# Patient Record
Sex: FEMALE | Race: WHITE | NOT HISPANIC OR LATINO | Employment: UNEMPLOYED | URBAN - METROPOLITAN AREA
[De-identification: names, ages, dates, MRNs, and addresses within clinical notes are randomized per-mention and may not be internally consistent; named-entity substitution may affect disease eponyms.]

---

## 2024-05-08 ENCOUNTER — OFFICE VISIT (OUTPATIENT)
Dept: URGENT CARE | Facility: CLINIC | Age: 74
End: 2024-05-08
Payer: COMMERCIAL

## 2024-05-08 VITALS
OXYGEN SATURATION: 96 % | DIASTOLIC BLOOD PRESSURE: 85 MMHG | RESPIRATION RATE: 16 BRPM | HEART RATE: 89 BPM | HEIGHT: 60 IN | SYSTOLIC BLOOD PRESSURE: 144 MMHG | BODY MASS INDEX: 26.11 KG/M2 | WEIGHT: 133 LBS | TEMPERATURE: 98 F

## 2024-05-08 DIAGNOSIS — J45.40 MODERATE PERSISTENT ASTHMA, UNSPECIFIED WHETHER COMPLICATED: Primary | ICD-10-CM

## 2024-05-08 DIAGNOSIS — R06.02 CHRONIC SHORTNESS OF BREATH: ICD-10-CM

## 2024-05-08 PROCEDURE — 99204 OFFICE O/P NEW MOD 45 MIN: CPT | Mod: S$GLB,,,

## 2024-05-08 RX ORDER — VITAMIN E (DL,TOCOPHERYL ACET) 22.5 MG/ML
DROPS ORAL
COMMUNITY

## 2024-05-08 RX ORDER — LEVALBUTEROL TARTRATE 45 UG/1
AEROSOL, METERED ORAL
COMMUNITY
End: 2024-05-08

## 2024-05-08 RX ORDER — OLMESARTAN MEDOXOMIL 40 MG/1
40 TABLET ORAL
COMMUNITY
Start: 2024-03-27

## 2024-05-08 RX ORDER — AMILORIDE HYDROCHLORIDE 5 MG/1
TABLET ORAL
COMMUNITY

## 2024-05-08 RX ORDER — AMLODIPINE BESYLATE 5 MG/1
5 TABLET ORAL
COMMUNITY
Start: 2024-04-15

## 2024-05-08 RX ORDER — LEVALBUTEROL TARTRATE 45 UG/1
1-2 AEROSOL, METERED ORAL EVERY 4 HOURS PRN
Qty: 15 G | Refills: 2 | Status: SHIPPED | OUTPATIENT
Start: 2024-05-08 | End: 2025-05-08

## 2024-05-08 NOTE — PROGRESS NOTES
Subjective:      Patient ID: Anjali Morfin is a 73 y.o. female.    Vitals:  height is 5' (1.524 m) and weight is 60.3 kg (133 lb). Her temperature is 97.5 °F (36.4 °C). Her blood pressure is 144/85 (abnormal) and her pulse is 89. Her respiration is 16 and oxygen saturation is 96%.     Chief Complaint: Medication Refill    Pt needs refill on inhaler. Referral to pulmonologist. Hx of asthma.  Patient reports shortness of breath as usual for her and use of her levalbuterol inhaler approximately twice a day.  She is currently in the market for a new primary care provider and would like a referral to a pulmonologist.  Patient was able to speak in complete sentences and lung sounds are clear bilaterally at time of exam.  Patient does not feel like she was in acute urgency she just needs a refill on her inhaler    Medication Refill  This is a new problem. Pertinent negatives include no coughing.       Constitution: Negative.   HENT: Negative.     Neck: neck negative.   Cardiovascular: Negative.    Respiratory:  Positive for chest tightness, shortness of breath (intermittent) and asthma. Negative for cough, sputum production and wheezing.    Gastrointestinal: Negative.    Musculoskeletal: Negative.    Skin: Negative.    Allergic/Immunologic: Positive for asthma.   Neurological: Negative.    Psychiatric/Behavioral: Negative.        Objective:     Physical Exam   Constitutional: She is oriented to person, place, and time. She does not appear ill. No distress. normal  HENT:   Head: Normocephalic and atraumatic.   Ears:   Right Ear: External ear normal.   Left Ear: External ear normal.   Nose: Nose normal.   Mouth/Throat: Mucous membranes are moist.   Eyes: Conjunctivae are normal.   Cardiovascular: Normal rate and normal heart sounds.   Pulmonary/Chest: Effort normal and breath sounds normal. No respiratory distress. She has no wheezes. She has no rhonchi. She has no rales.   Abdominal: Normal appearance.   Musculoskeletal:  Normal range of motion.         General: Normal range of motion.   Neurological: She is alert and oriented to person, place, and time. She displays no weakness.   Skin: Skin is warm and dry.   Psychiatric: Her behavior is normal. Mood, judgment and thought content normal.   Nursing note and vitals reviewed.      Assessment:     1. Moderate persistent asthma, unspecified whether complicated    2. Chronic shortness of breath        Plan:       Moderate persistent asthma, unspecified whether complicated  -     levalbuterol (XOPENEX HFA) 45 mcg/actuation inhaler; Inhale 1-2 puffs into the lungs every 4 (four) hours as needed for Wheezing. Rescue  Dispense: 15 g; Refill: 2  -     Ambulatory referral/consult to Pulmonology    Chronic shortness of breath  -     levalbuterol (XOPENEX HFA) 45 mcg/actuation inhaler; Inhale 1-2 puffs into the lungs every 4 (four) hours as needed for Wheezing. Rescue  Dispense: 15 g; Refill: 2      Discussed medication with patient who acknowledges understanding and is agreeable to POC. Follow up with primary care. Increase fluid intake. Red flags for ER discussed.

## 2024-05-08 NOTE — PATIENT INSTRUCTIONS
Inhaler as prescribed    Please follow up with pulmonology and let us know if there is anything else we can help you with

## 2024-08-29 ENCOUNTER — HOSPITAL ENCOUNTER (EMERGENCY)
Facility: HOSPITAL | Age: 74
Discharge: HOME | End: 2024-08-29
Attending: EMERGENCY MEDICINE
Payer: COMMERCIAL

## 2024-08-29 ENCOUNTER — OFFICE VISIT (OUTPATIENT)
Dept: URGENT CARE | Facility: CLINIC | Age: 74
End: 2024-08-29
Payer: COMMERCIAL

## 2024-08-29 VITALS
HEIGHT: 60 IN | RESPIRATION RATE: 16 BRPM | OXYGEN SATURATION: 98 % | BODY MASS INDEX: 28.07 KG/M2 | WEIGHT: 143 LBS | DIASTOLIC BLOOD PRESSURE: 76 MMHG | HEART RATE: 88 BPM | TEMPERATURE: 98 F | SYSTOLIC BLOOD PRESSURE: 156 MMHG

## 2024-08-29 VITALS
TEMPERATURE: 98 F | RESPIRATION RATE: 20 BRPM | HEIGHT: 60 IN | WEIGHT: 144 LBS | DIASTOLIC BLOOD PRESSURE: 67 MMHG | OXYGEN SATURATION: 99 % | SYSTOLIC BLOOD PRESSURE: 149 MMHG | BODY MASS INDEX: 28.27 KG/M2 | HEART RATE: 96 BPM

## 2024-08-29 DIAGNOSIS — L03.114 LEFT ARM CELLULITIS: Primary | ICD-10-CM

## 2024-08-29 DIAGNOSIS — M79.89 LEFT ARM SWELLING: Primary | ICD-10-CM

## 2024-08-29 PROCEDURE — 99284 EMERGENCY DEPT VISIT MOD MDM: CPT

## 2024-08-29 RX ORDER — CEPHALEXIN 500 MG/1
500 CAPSULE ORAL 4 TIMES DAILY
Qty: 40 CAPSULE | Refills: 0 | Status: SHIPPED | OUTPATIENT
Start: 2024-08-29 | End: 2024-09-08

## 2024-08-29 RX ORDER — ERGOCALCIFEROL 1.25 MG/1
50000 CAPSULE ORAL
COMMUNITY

## 2024-08-29 RX ORDER — MULTIVITAMIN
1 TABLET ORAL DAILY
COMMUNITY

## 2024-08-29 RX ORDER — CALCIUM CARBONATE 200(500)MG
1 TABLET,CHEWABLE ORAL
COMMUNITY

## 2024-08-29 RX ORDER — ATENOLOL 50 MG/1
TABLET ORAL
COMMUNITY

## 2024-08-29 RX ORDER — HYDROCORTISONE 25 MG/G
CREAM TOPICAL DAILY
Qty: 28 G | Refills: 2 | Status: SHIPPED | OUTPATIENT
Start: 2024-08-29 | End: 2024-09-05

## 2024-08-29 RX ORDER — HYDROGEN PEROXIDE 3 %
20 SOLUTION, NON-ORAL MISCELLANEOUS
COMMUNITY

## 2024-08-29 RX ORDER — LISINOPRIL 40 MG/1
40 TABLET ORAL
COMMUNITY

## 2024-08-29 NOTE — DISCHARGE INSTRUCTIONS
Allergy to polyethylene glycol additive noted in you should inquire with the pharmacist to ensure this is not an additive to this particular preparation prior to initiation.  This may be substituted for a different preparation if this is an issue.  Consult with the pharmacist.  Topical steroid recommended in case of allergic phenomenon as a cause of redness and swelling.

## 2024-08-29 NOTE — PROGRESS NOTES
Subjective:      Patient ID: Anjali Morfin is a 74 y.o. female.    Vitals:  height is 5' (1.524 m) and weight is 64.9 kg (143 lb). Her temperature is 97.9 °F (36.6 °C). Her blood pressure is 156/76 (abnormal) and her pulse is 88. Her respiration is 16 and oxygen saturation is 98%.     Chief Complaint: Edema    Anjali Morfin is a 74 year old female presenting to the clinic with redness, warmth, and swelling to the left upper extremity. She denies injury/trauma and has had no fever.     Edema  This is a new problem. The current episode started today. The problem has been gradually worsening. Associated symptoms include joint swelling.       Constitution: Negative.   HENT: Negative.     Neck: neck negative.   Cardiovascular: Negative.    Eyes: Negative.    Respiratory: Negative.     Gastrointestinal: Negative.    Genitourinary: Negative.    Musculoskeletal:  Positive for joint swelling.   Skin:  Positive for color change.   Neurological: Negative.       Objective:     Physical Exam   Constitutional: She is oriented to person, place, and time. She appears well-developed. She is cooperative.  Non-toxic appearance. She does not appear ill. No distress.   HENT:   Head: Normocephalic and atraumatic.   Ears:   Right Ear: Hearing and external ear normal.   Left Ear: Hearing and external ear normal.   Nose: Nose normal. No mucosal edema, rhinorrhea or nasal deformity. No epistaxis. Right sinus exhibits no maxillary sinus tenderness and no frontal sinus tenderness. Left sinus exhibits no maxillary sinus tenderness and no frontal sinus tenderness.   Mouth/Throat: Uvula is midline, oropharynx is clear and moist and mucous membranes are normal. No trismus in the jaw. Normal dentition. No uvula swelling. No oropharyngeal exudate, posterior oropharyngeal edema or posterior oropharyngeal erythema.   Eyes: Conjunctivae and lids are normal. No scleral icterus.   Neck: Trachea normal and phonation normal. Neck supple. No edema  present. No erythema present. No neck rigidity present.   Cardiovascular: Normal rate, regular rhythm, normal heart sounds and normal pulses.   Pulmonary/Chest: Effort normal and breath sounds normal. No respiratory distress. She has no decreased breath sounds. She has no rhonchi.   Abdominal: Normal appearance.   Musculoskeletal: Normal range of motion.         General: No deformity. Normal range of motion.      Left upper arm: She exhibits swelling.      Comments: Full ROM of left wrist, elbow  Left upper extremity erythematous, warm from wrist to upper arm   Neurological: She is alert and oriented to person, place, and time. She exhibits normal muscle tone. Coordination normal.   Skin: Skin is warm, dry, intact, not diaphoretic and not pale.   Psychiatric: Her speech is normal and behavior is normal. Judgment and thought content normal.   Nursing note and vitals reviewed.      Assessment:     1. Left arm swelling        Plan:   The patient has sudden onset of left upper extremity erythema, warmth, and swelling. Full ROM of wrist and elbow. Multiple allergies listed. She will require evaluation in the ED for labs/imaging and further management. EMS offered but patient refused and will seek treatment via personal vehicle.     Left arm swelling

## 2024-08-29 NOTE — ED PROVIDER NOTES
Chief complaint:  Arm Swelling (Patient has left arm swelling and redness since yesterday )      HPI:  Anjali Morfin is a 74 y.o. female with hx asthma, multiple allergies including to abx presenting with less than one day of mild edema and redness to the left forearm region.  She does note multiple prior resolved lesion see associated with likely bug bites within the last several days including one on the forearm preceding.  These have receded.  She denies other systemic symptoms such as fever or vomiting.  There is no pain with movement of the arm.  There is no fall or injury.  She does have pre-existing dermatitis on one aspect of the forearm but with a greater redness present along with mild swelling not involving the hand.  There is no new numbness or weakness.    ROS: As per HPI and below:  No joint pain, vomiting, new rash elsewhere, lip or tongue swelling.    Review of patient's allergies indicates:   Allergen Reactions    Ciprofloxacin Itching     Contains polyethylene glycol    Levofloxacin Itching     Contains polyethylene glycol    Polyethylene glycol Itching and Shortness Of Breath    Polyethylene glycols Itching and Rash     Other Reaction(s): wheezing    Sulfa (sulfonamide antibiotics) Itching, Rash and Shortness Of Breath    Valsartan Itching and Shortness Of Breath     Contains polyethylene glycol    Acetaminophen      Due to ingredients    Albuterol sulfate Itching     Made asthma problems worse    Cephalexin Itching    Clindamycin Itching    Hydrochlorothiazide Itching    Ibuprofen      Due to ingredients    Sulfate ion     Celecoxib Itching and Rash    Doxycycline Itching and Rash    Oxycodone Itching and Rash    Oxycodone-acetaminophen Itching and Rash    Penicillin Rash     Family history of it. Pt herself has not had reaction       Patient's Medications   New Prescriptions    CEPHALEXIN (KEFLEX) 500 MG CAPSULE    Take 1 capsule (500 mg total) by mouth 4 (four) times daily. for 10 days     HYDROCORTISONE 2.5 % CREAM    Apply topically once daily. for 7 days   Previous Medications    AMILORIDE (MIDAMOR) 5 MG TAB    1 tablet twice a day by oral route.    AMLODIPINE (NORVASC) 5 MG TABLET    Take 5 mg by mouth.    ASCORBIC ACID, VITAMIN C, ORAL    Take by mouth.    ATENOLOL (TENORMIN) 50 MG TABLET    1 tablet twice a day by oral route.    BUDESONIDE 180MCG (PULMICORT 180MCG) 180 MCG/ACTUATION AEPB    1 inhalation twice a day by inhalation route.    CALCIUM CARBONATE (TUMS) 200 MG CALCIUM (500 MG) CHEWABLE TABLET    Take 1 tablet by mouth.    ERGOCALCIFEROL (VITAMIN D2) 50,000 UNIT CAP    Take 50,000 Units by mouth every 7 days.    ESOMEPRAZOLE (NEXIUM) 20 MG CAPSULE    Take 20 mg by mouth before breakfast.    LEVALBUTEROL (XOPENEX HFA) 45 MCG/ACTUATION INHALER    Inhale 1-2 puffs into the lungs every 4 (four) hours as needed for Wheezing. Rescue    LISINOPRIL (PRINIVIL,ZESTRIL) 40 MG TABLET    Take 40 mg by mouth.    MULTIVITAMIN (THERAGRAN) PER TABLET    Take 1 tablet by mouth once daily.    OLMESARTAN (BENICAR) 40 MG TABLET    Take 40 mg by mouth.    VITAMIN E, DL, ACETATE, 22.5 MG (50 UNIT)/ML DROP    Take by mouth.   Modified Medications    No medications on file   Discontinued Medications    No medications on file       PMH:  As per HPI and below:  No past medical history on file.  No past surgical history on file.    Social History     Socioeconomic History    Marital status:        No family history on file.    Physical Exam:    Vitals:    08/29/24 1102   BP: (!) 149/67   Pulse: 96   Resp:    Temp:      GENERAL:  No apparent distress.  Alert.    HEENT:  Moist mucous membranes.  Normocephalic and atraumatic.    NECK:  No swelling.  Midline trachea.   CARDIOVASCULAR:  Regular rate and rhythm.  2+ radial pulses.    PULMONARY:  Lungs clear to auscultation bilaterally.  No wheezes, rales, or rhonci.    EXTREMITIES:  Warm and well perfused.  Brisk capillary refill.  Mild noncircumferential edema  and erythema to the left forearm sparing the wrist and hand.  This does not involve the humeral region.  There is minimal tenderness.  There is no crepitus.  There is no pain with passive or active range of motion at the elbow and wrist.  NEUROLOGICAL:  Normal mental status.  Appropriate and conversant.  5/5 strength with equal sensation light touch in the bilateral upper extremities.  SKIN:  Left upper extremity rash as described above in picture below.        Labs Reviewed - No data to display    Current Discharge Medication List        START taking these medications    Details   cephALEXin (KEFLEX) 500 MG capsule Take 1 capsule (500 mg total) by mouth 4 (four) times daily. for 10 days  Qty: 40 capsule, Refills: 0      hydrocortisone 2.5 % cream Apply topically once daily. for 7 days  Qty: 28 g, Refills: 2           CONTINUE these medications which have NOT CHANGED    Details   aMILoride (MIDAMOR) 5 MG Tab 1 tablet twice a day by oral route.      amLODIPine (NORVASC) 5 MG tablet Take 5 mg by mouth.      ASCORBIC ACID, VITAMIN C, ORAL Take by mouth.      atenoloL (TENORMIN) 50 MG tablet 1 tablet twice a day by oral route.      budesonide 180mcg (PULMICORT 180MCG) 180 mcg/actuation AePB 1 inhalation twice a day by inhalation route.      calcium carbonate (TUMS) 200 mg calcium (500 mg) chewable tablet Take 1 tablet by mouth.      ergocalciferol (VITAMIN D2) 50,000 unit Cap Take 50,000 Units by mouth every 7 days.      esomeprazole (NEXIUM) 20 MG capsule Take 20 mg by mouth before breakfast.      levalbuterol (XOPENEX HFA) 45 mcg/actuation inhaler Inhale 1-2 puffs into the lungs every 4 (four) hours as needed for Wheezing. Rescue  Qty: 15 g, Refills: 2    Associated Diagnoses: Moderate persistent asthma, unspecified whether complicated; Chronic shortness of breath      lisinopriL (PRINIVIL,ZESTRIL) 40 MG tablet Take 40 mg by mouth.      multivitamin (THERAGRAN) per tablet Take 1 tablet by mouth once daily.       olmesartan (BENICAR) 40 MG tablet Take 40 mg by mouth.      vitamin E, dl, acetate, 22.5 mg (50 unit)/mL Drop Take by mouth.             No orders of the defined types were placed in this encounter.      Imaging Results    None              MDM:    74 y.o. female with left arm swelling and redness.  Differential includes localizer reaction versus cellulitis.  I doubt deep space infection or life-threatening process such as pyomyositis or necrotizing fasciitis.  I do not think further emergent surgical consultation or imaging is indicated.  I do not think she requires admission for antibiotics.  P.o. antibiotics begun in case for early cellulitis with outpatient follow-up recommended.  Localized reaction considered with topical steroid also recommended pending follow-up.  There is no sign of anaphylaxis.  Patient has numerous allergies reviewed and cephalosporin allergy only relates to potential polyethylene glycol additive.  I did instruct her to review this with the pharmacist prior to beginning medication with substitution as necessary.  Cephalexin 10 day course prescribed pending close follow-up.  Detailed return precautions reviewed.    Diagnoses:    1. Left upper extremity cellulitis       Khoi Telles MD  08/29/24 1125

## 2024-10-02 ENCOUNTER — TELEPHONE (OUTPATIENT)
Dept: URGENT CARE | Facility: CLINIC | Age: 74
End: 2024-10-02

## 2024-10-02 ENCOUNTER — OFFICE VISIT (OUTPATIENT)
Dept: URGENT CARE | Facility: CLINIC | Age: 74
End: 2024-10-02
Payer: COMMERCIAL

## 2024-10-02 VITALS
BODY MASS INDEX: 28.27 KG/M2 | TEMPERATURE: 98 F | DIASTOLIC BLOOD PRESSURE: 86 MMHG | WEIGHT: 144 LBS | HEIGHT: 60 IN | SYSTOLIC BLOOD PRESSURE: 156 MMHG | HEART RATE: 98 BPM | OXYGEN SATURATION: 98 %

## 2024-10-02 DIAGNOSIS — Z87.09 HISTORY OF ASTHMA: ICD-10-CM

## 2024-10-02 DIAGNOSIS — Z86.79 HISTORY OF HYPERTENSION: ICD-10-CM

## 2024-10-02 DIAGNOSIS — J45.901 EXACERBATION OF ASTHMA, UNSPECIFIED ASTHMA SEVERITY, UNSPECIFIED WHETHER PERSISTENT: ICD-10-CM

## 2024-10-02 DIAGNOSIS — M19.90 OSTEOARTHRITIS, UNSPECIFIED OSTEOARTHRITIS TYPE, UNSPECIFIED SITE: ICD-10-CM

## 2024-10-02 DIAGNOSIS — R06.02 SOB (SHORTNESS OF BREATH): Primary | ICD-10-CM

## 2024-10-02 PROBLEM — R21 DIFFUSE PAPULAR RASH: Status: ACTIVE | Noted: 2017-08-09

## 2024-10-02 PROCEDURE — 94640 AIRWAY INHALATION TREATMENT: CPT | Mod: S$GLB,,, | Performed by: NURSE PRACTITIONER

## 2024-10-02 PROCEDURE — 99214 OFFICE O/P EST MOD 30 MIN: CPT | Mod: 25,S$GLB,, | Performed by: NURSE PRACTITIONER

## 2024-10-02 RX ORDER — PREDNISONE 20 MG/1
20 TABLET ORAL 2 TIMES DAILY
Qty: 6 TABLET | Refills: 0 | Status: SHIPPED | OUTPATIENT
Start: 2024-10-02 | End: 2024-10-05

## 2024-10-02 RX ORDER — LEVALBUTEROL INHALATION SOLUTION 1.25 MG/3ML
1.25 SOLUTION RESPIRATORY (INHALATION)
Status: COMPLETED | OUTPATIENT
Start: 2024-10-02 | End: 2024-10-02

## 2024-10-02 RX ORDER — LEVALBUTEROL TARTRATE 45 UG/1
1-2 AEROSOL, METERED ORAL EVERY 4 HOURS PRN
Qty: 15 G | Refills: 1 | Status: SHIPPED | OUTPATIENT
Start: 2024-10-02

## 2024-10-02 RX ADMIN — LEVALBUTEROL INHALATION SOLUTION 1.25 MG: 1.25 SOLUTION RESPIRATORY (INHALATION) at 04:10

## 2024-10-02 NOTE — PROGRESS NOTES
Subjective:      Patient ID: Anjali Morfin is a 74 y.o. female.    Vitals:  height is 5' (1.524 m) and weight is 65.3 kg (144 lb). Her oral temperature is 98.2 °F (36.8 °C). Her blood pressure is 156/86 (abnormal) and her pulse is 98. Her oxygen saturation is 98%.     Chief Complaint: Asthma    Patient reports she has a history asthma and has not had issues with her asthma for several weeks and is out of her Xopenex inhaler.  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  Denies viral illness symptoms.  Denies chest pain states that her symptoms are exactly as when her asthma flares up.    Asthma  She complains of shortness of breath. There is no cough. The current episode started today. Pertinent negatives include no chest pain, fever or sore throat. Her past medical history is significant for asthma.       Constitution: Negative for chills and fever.   HENT:  Negative for congestion and sore throat.    Cardiovascular:  Negative for chest pain.   Respiratory:  Positive for chest tightness, shortness of breath and asthma. Negative for cough.    Skin:  Negative for rash.   Allergic/Immunologic: Positive for asthma.      Objective:     Physical Exam   Constitutional: She is oriented to person, place, and time. She is cooperative.  Non-toxic appearance. She does not appear ill. No distress. awake  HENT:   Head: Normocephalic and atraumatic.   Nose: Nose normal.   Mouth/Throat: Mucous membranes are moist.   Eyes: Conjunctivae are normal.   Cardiovascular: Normal rate.   Pulmonary/Chest: Effort normal. No accessory muscle usage or stridor. No tachypnea. No respiratory distress. She has decreased breath sounds in the right middle field, the right lower field, the left middle field and the left lower field. She has no wheezes. She has no rhonchi. She has no rales. She exhibits no tenderness.   Able to speak in  full sentences without pause for breath         Comments: Able to speak in full sentences without pause for breath    Abdominal: Normal appearance.   Neurological: no focal deficit. She is alert and oriented to person, place, and time.   Skin: Skin is warm, dry, not diaphoretic and no rash. Capillary refill takes 2 to 3 seconds.   Psychiatric: Mood normal.   Nursing note and vitals reviewed.      Assessment:     1. SOB (shortness of breath)    2. Exacerbation of asthma, unspecified asthma severity, unspecified whether persistent    3. History of hypertension    4. Osteoarthritis, unspecified osteoarthritis type, unspecified site    5. History of asthma      Xopenex nebulizer tx given in clinic.  Pt reports breathing improved.  BBS with improved air movement, no wheezing.   Plan:       SOB (shortness of breath)  -     levalbuterol nebulizer solution 1.25 mg    Exacerbation of asthma, unspecified asthma severity, unspecified whether persistent  -     levalbuterol nebulizer solution 1.25 mg  -     levalbuterol (XOPENEX HFA) 45 mcg/actuation inhaler; Inhale 1-2 puffs into the lungs every 4 (four) hours as needed for Wheezing. Rescue  Dispense: 15 g; Refill: 1  -     Ambulatory referral/consult to Pulmonology  -     Ambulatory referral/consult to Family Practice  -     predniSONE (DELTASONE) 20 MG tablet; Take 1 tablet (20 mg total) by mouth 2 (two) times daily. for 3 days  Dispense: 6 tablet; Refill: 0    History of hypertension  -     Ambulatory referral/consult to Family Practice    Osteoarthritis, unspecified osteoarthritis type, unspecified site  -     Ambulatory referral/consult to Family Practice    History of asthma  -     Ambulatory referral/consult to Family Practice

## 2024-10-02 NOTE — PATIENT INSTRUCTIONS
Xopenex inhaler every 4 hours as needed for chest tightness, shortness of breath, wheezing.    Prednisone take as prescribed if symptoms persist    Referral was placed to pulmonology.  Someone should be calling you to schedule an appointment    Referral was placed to family medicine.  Someone should be calling you to schedule an appointment    Return to clinic or seek medical re-evaluation if symptoms worsen or fail to improve after 48-72 hours the above treatment plan.  ER for significant worsening of symptoms

## 2024-10-03 NOTE — TELEPHONE ENCOUNTER
Pt requesting new primary care doctor after being seen at . Appt booked per pts request. Time and date confirmed. 10/10/2024 at 2:30 with Dr. Pisano. Pt has no further questions at this time.

## 2024-10-10 ENCOUNTER — OFFICE VISIT (OUTPATIENT)
Dept: FAMILY MEDICINE | Facility: CLINIC | Age: 74
End: 2024-10-10
Payer: COMMERCIAL

## 2024-10-10 ENCOUNTER — LAB VISIT (OUTPATIENT)
Dept: LAB | Facility: HOSPITAL | Age: 74
End: 2024-10-10
Attending: STUDENT IN AN ORGANIZED HEALTH CARE EDUCATION/TRAINING PROGRAM
Payer: COMMERCIAL

## 2024-10-10 VITALS
OXYGEN SATURATION: 95 % | RESPIRATION RATE: 18 BRPM | WEIGHT: 147.69 LBS | BODY MASS INDEX: 28.99 KG/M2 | HEIGHT: 60 IN | HEART RATE: 88 BPM | SYSTOLIC BLOOD PRESSURE: 130 MMHG | DIASTOLIC BLOOD PRESSURE: 82 MMHG

## 2024-10-10 DIAGNOSIS — J45.40 MODERATE PERSISTENT ASTHMA WITHOUT COMPLICATION: ICD-10-CM

## 2024-10-10 DIAGNOSIS — I10 BENIGN ESSENTIAL HTN: ICD-10-CM

## 2024-10-10 DIAGNOSIS — R41.3 AMNESIA: ICD-10-CM

## 2024-10-10 DIAGNOSIS — R73.03 PRE-DIABETES: ICD-10-CM

## 2024-10-10 DIAGNOSIS — R41.3 OTHER AMNESIA: ICD-10-CM

## 2024-10-10 DIAGNOSIS — Z78.0 ASYMPTOMATIC MENOPAUSAL STATE: ICD-10-CM

## 2024-10-10 DIAGNOSIS — Z88.0 PENICILLIN ALLERGY: ICD-10-CM

## 2024-10-10 DIAGNOSIS — Z00.00 ROUTINE GENERAL MEDICAL EXAMINATION AT A HEALTH CARE FACILITY: Primary | ICD-10-CM

## 2024-10-10 LAB
ALBUMIN SERPL BCP-MCNC: 4.2 G/DL (ref 3.5–5.2)
ALP SERPL-CCNC: 56 U/L (ref 55–135)
ALT SERPL W/O P-5'-P-CCNC: 26 U/L (ref 10–44)
ANION GAP SERPL CALC-SCNC: 6 MMOL/L (ref 8–16)
AST SERPL-CCNC: 24 U/L (ref 10–40)
BASOPHILS # BLD AUTO: 0.03 K/UL (ref 0–0.2)
BASOPHILS NFR BLD: 0.3 % (ref 0–1.9)
BILIRUB SERPL-MCNC: 0.3 MG/DL (ref 0.1–1)
BUN SERPL-MCNC: 30 MG/DL (ref 8–23)
CALCIUM SERPL-MCNC: 9.4 MG/DL (ref 8.7–10.5)
CHLORIDE SERPL-SCNC: 107 MMOL/L (ref 95–110)
CO2 SERPL-SCNC: 26 MMOL/L (ref 23–29)
CREAT SERPL-MCNC: 1.1 MG/DL (ref 0.5–1.4)
DIFFERENTIAL METHOD BLD: ABNORMAL
EOSINOPHIL # BLD AUTO: 0.9 K/UL (ref 0–0.5)
EOSINOPHIL NFR BLD: 10 % (ref 0–8)
ERYTHROCYTE [DISTWIDTH] IN BLOOD BY AUTOMATED COUNT: 12.8 % (ref 11.5–14.5)
EST. GFR  (NO RACE VARIABLE): 52.7 ML/MIN/1.73 M^2
ESTIMATED AVG GLUCOSE: 114 MG/DL (ref 68–131)
GLUCOSE SERPL-MCNC: 104 MG/DL (ref 70–110)
HBA1C MFR BLD: 5.6 % (ref 4–5.6)
HCT VFR BLD AUTO: 38.6 % (ref 37–48.5)
HGB BLD-MCNC: 12 G/DL (ref 12–16)
IMM GRANULOCYTES # BLD AUTO: 0.04 K/UL (ref 0–0.04)
IMM GRANULOCYTES NFR BLD AUTO: 0.4 % (ref 0–0.5)
LYMPHOCYTES # BLD AUTO: 2.4 K/UL (ref 1–4.8)
LYMPHOCYTES NFR BLD: 26.2 % (ref 18–48)
MCH RBC QN AUTO: 31.1 PG (ref 27–31)
MCHC RBC AUTO-ENTMCNC: 31.1 G/DL (ref 32–36)
MCV RBC AUTO: 100 FL (ref 82–98)
MONOCYTES # BLD AUTO: 0.7 K/UL (ref 0.3–1)
MONOCYTES NFR BLD: 7.6 % (ref 4–15)
NEUTROPHILS # BLD AUTO: 5.2 K/UL (ref 1.8–7.7)
NEUTROPHILS NFR BLD: 55.5 % (ref 38–73)
NRBC BLD-RTO: 0 /100 WBC
PLATELET # BLD AUTO: 302 K/UL (ref 150–450)
PMV BLD AUTO: 10.8 FL (ref 9.2–12.9)
POTASSIUM SERPL-SCNC: 5.6 MMOL/L (ref 3.5–5.1)
PROT SERPL-MCNC: 7.1 G/DL (ref 6–8.4)
RBC # BLD AUTO: 3.86 M/UL (ref 4–5.4)
SODIUM SERPL-SCNC: 139 MMOL/L (ref 136–145)
TSH SERPL DL<=0.005 MIU/L-ACNC: 0.88 UIU/ML (ref 0.4–4)
WBC # BLD AUTO: 9.3 K/UL (ref 3.9–12.7)

## 2024-10-10 PROCEDURE — 3288F FALL RISK ASSESSMENT DOCD: CPT | Mod: CPTII,S$GLB,, | Performed by: STUDENT IN AN ORGANIZED HEALTH CARE EDUCATION/TRAINING PROGRAM

## 2024-10-10 PROCEDURE — 83036 HEMOGLOBIN GLYCOSYLATED A1C: CPT | Performed by: STUDENT IN AN ORGANIZED HEALTH CARE EDUCATION/TRAINING PROGRAM

## 2024-10-10 PROCEDURE — 84443 ASSAY THYROID STIM HORMONE: CPT | Performed by: STUDENT IN AN ORGANIZED HEALTH CARE EDUCATION/TRAINING PROGRAM

## 2024-10-10 PROCEDURE — 36415 COLL VENOUS BLD VENIPUNCTURE: CPT | Mod: PO | Performed by: STUDENT IN AN ORGANIZED HEALTH CARE EDUCATION/TRAINING PROGRAM

## 2024-10-10 PROCEDURE — 1160F RVW MEDS BY RX/DR IN RCRD: CPT | Mod: CPTII,S$GLB,, | Performed by: STUDENT IN AN ORGANIZED HEALTH CARE EDUCATION/TRAINING PROGRAM

## 2024-10-10 PROCEDURE — 85025 COMPLETE CBC W/AUTO DIFF WBC: CPT | Performed by: STUDENT IN AN ORGANIZED HEALTH CARE EDUCATION/TRAINING PROGRAM

## 2024-10-10 PROCEDURE — 3008F BODY MASS INDEX DOCD: CPT | Mod: CPTII,S$GLB,, | Performed by: STUDENT IN AN ORGANIZED HEALTH CARE EDUCATION/TRAINING PROGRAM

## 2024-10-10 PROCEDURE — 80053 COMPREHEN METABOLIC PANEL: CPT | Performed by: STUDENT IN AN ORGANIZED HEALTH CARE EDUCATION/TRAINING PROGRAM

## 2024-10-10 PROCEDURE — 4010F ACE/ARB THERAPY RXD/TAKEN: CPT | Mod: CPTII,S$GLB,, | Performed by: STUDENT IN AN ORGANIZED HEALTH CARE EDUCATION/TRAINING PROGRAM

## 2024-10-10 PROCEDURE — 1159F MED LIST DOCD IN RCRD: CPT | Mod: CPTII,S$GLB,, | Performed by: STUDENT IN AN ORGANIZED HEALTH CARE EDUCATION/TRAINING PROGRAM

## 2024-10-10 PROCEDURE — 1101F PT FALLS ASSESS-DOCD LE1/YR: CPT | Mod: CPTII,S$GLB,, | Performed by: STUDENT IN AN ORGANIZED HEALTH CARE EDUCATION/TRAINING PROGRAM

## 2024-10-10 PROCEDURE — 99999 PR PBB SHADOW E&M-EST. PATIENT-LVL V: CPT | Mod: PBBFAC,,, | Performed by: STUDENT IN AN ORGANIZED HEALTH CARE EDUCATION/TRAINING PROGRAM

## 2024-10-10 PROCEDURE — 99397 PER PM REEVAL EST PAT 65+ YR: CPT | Mod: S$GLB,,, | Performed by: STUDENT IN AN ORGANIZED HEALTH CARE EDUCATION/TRAINING PROGRAM

## 2024-10-10 PROCEDURE — 1125F AMNT PAIN NOTED PAIN PRSNT: CPT | Mod: CPTII,S$GLB,, | Performed by: STUDENT IN AN ORGANIZED HEALTH CARE EDUCATION/TRAINING PROGRAM

## 2024-10-10 PROCEDURE — 3079F DIAST BP 80-89 MM HG: CPT | Mod: CPTII,S$GLB,, | Performed by: STUDENT IN AN ORGANIZED HEALTH CARE EDUCATION/TRAINING PROGRAM

## 2024-10-10 PROCEDURE — 3044F HG A1C LEVEL LT 7.0%: CPT | Mod: CPTII,S$GLB,, | Performed by: STUDENT IN AN ORGANIZED HEALTH CARE EDUCATION/TRAINING PROGRAM

## 2024-10-10 PROCEDURE — 3075F SYST BP GE 130 - 139MM HG: CPT | Mod: CPTII,S$GLB,, | Performed by: STUDENT IN AN ORGANIZED HEALTH CARE EDUCATION/TRAINING PROGRAM

## 2024-10-10 RX ORDER — FLUTICASONE PROPIONATE 44 UG/1
1 AEROSOL, METERED RESPIRATORY (INHALATION) DAILY PRN
Qty: 10.6 G | Refills: 0 | Status: SHIPPED | OUTPATIENT
Start: 2024-10-10 | End: 2025-10-10

## 2024-10-10 NOTE — PROGRESS NOTES
[facility] CLINIC NOTE    Patient Name: Anjali Morfin  YOB: 1950    PRESENTING HISTORY     History of Present Illness:  Ms. Anjali Morfin is a 74 y.o. female here to establish care.     PMHx: asthma, dermatitis, HTN, glaucoma, alopecia  Surg: bilateral TKA, tonsillectomy in childhood,   Fhx: No colon cancer, no breast cancer.   Social: No tobacco. Alcohol almost never. Moved from [facility].     Asthma- levalbuterol monotherapy. Using BID right now.     Earlier last year was started on hydralazine for HTN. Uptitrated in Spring.   Lost her memory. She attributes to the medication.   Had handwriting issues, difficulty with the mechanical motion.   No investigations performed.     Mammograms- not done in a few years. Doesn't want to do.   Colonoscopy- no priors.     Had labs last year- A1c, LDL looked good. Lytes, vitamins looked good> GFR in 50s. '    C/o memory loss.     ROS      OBJECTIVE:   Vital Signs:  Vitals:    10/10/24 1412   BP: 130/82   Pulse: 88   Resp: 18   SpO2: 95%   Weight: 67 kg (147 lb 11.3 oz)   Height: 5' (1.524 m)         Physical Exam  Vitals and nursing note reviewed.   Constitutional:       Appearance: She is not ill-appearing, toxic-appearing or diaphoretic.   HENT:      Head: Normocephalic and atraumatic.      Right Ear: External ear normal.      Left Ear: External ear normal.   Eyes:      General: No scleral icterus.     Conjunctiva/sclera: Conjunctivae normal.      Pupils: Pupils are equal, round, and reactive to light.   Neck:      Thyroid: No thyromegaly.   Cardiovascular:      Rate and Rhythm: Normal rate and regular rhythm.      Heart sounds: Normal heart sounds. No murmur heard.  Pulmonary:      Effort: Pulmonary effort is normal. No respiratory distress.      Breath sounds: Normal breath sounds. No wheezing or rales.   Musculoskeletal:         General: No tenderness or deformity. Normal range of motion.      Cervical back: Normal range of motion and  neck supple.      Right lower leg: No edema.      Left lower leg: No edema.   Lymphadenopathy:      Cervical: No cervical adenopathy.   Skin:     General: Skin is warm and dry.      Findings: No erythema or rash.   Neurological:      Mental Status: She is alert and oriented to person, place, and time.      Gait: Gait is intact.   Psychiatric:         Mood and Affect: Mood and affect normal.         Cognition and Memory: Memory normal.         Judgment: Judgment normal.       MMSE 27/30.       ASSESSMENT & PLAN:     Routine general medical examination at a health care facility    Moderate persistent asthma without complication  -     fluticasone propionate (FLOVENT HFA) 44 mcg/actuation inhaler; Inhale 1 puff into the lungs daily as needed. Controller  Dispense: 10.6 g; Refill: 0  -     CBC Auto Differential; Future; Expected date: 10/10/2024  -     TSH; Future; Expected date: 10/10/2024    Penicillin allergy  -     Ambulatory referral/consult to Allergy; Future; Expected date: 10/17/2024    Benign essential HTN  -     Comprehensive Metabolic Panel; Future; Expected date: 10/10/2024    Pre-diabetes  -     Hemoglobin A1C; Future; Expected date: 10/10/2024    Asymptomatic menopausal state  -     DXA Bone Density Axial Skeleton 1 or more sites; Future; Expected date: 10/10/2024    Amnesia    Other amnesia  -     MRI Brain Without Contrast; Future; Expected date: 10/10/2024      She has allergy, asthma, atopy. THink she would benefit from seeing an allergist, particularly in light of numerous abx allergies. Would prob benefit from PCN challenge.     Start on inhaled CS for asthma management.     I suspect she has hyperkalemia and that may have been motivation behind BP med switch. Will make no changes for now.         Andres Pisano  Internal Medicine

## 2024-10-11 DIAGNOSIS — J45.40 MODERATE PERSISTENT ASTHMA WITHOUT COMPLICATION: ICD-10-CM

## 2024-10-11 RX ORDER — BUDESONIDE 90 UG/1
1 AEROSOL, POWDER RESPIRATORY (INHALATION) 2 TIMES DAILY
Qty: 1 EACH | Refills: 0 | OUTPATIENT
Start: 2024-10-11

## 2024-10-11 RX ORDER — FLUTICASONE FUROATE AND VILANTEROL 100; 25 UG/1; UG/1
1 POWDER RESPIRATORY (INHALATION) DAILY
Qty: 60 EACH | Refills: 3 | Status: SHIPPED | OUTPATIENT
Start: 2024-10-11

## 2024-10-11 NOTE — TELEPHONE ENCOUNTER
Refill Routing Note   Medication(s) are not appropriate for processing by [facility] for the following reason(s):        Clarification of medication (Rx) details    [facility] action(s):  Defer           Pharmacist review requested: Yes     Appointments  past 12m or future 3m with PCP    Date Provider   Last Visit   10/10/2024 Andres Pisano MD   Next Visit   Visit date not found Andres Pisano MD   ED visits in past 90 days: 1        Note composed:10:47 PM 10/10/2024

## 2024-10-11 NOTE — TELEPHONE ENCOUNTER
Refill Routing Note   Medication(s) are not appropriate for processing by [facility] for the following reason(s):        Med affordability    [facility] action(s):  Defer        Medication Therapy Plan: pended for suggested alternative for provider review.    Pharmacist review requested: Yes     Appointments  past 12m or future 3m with PCP    Date Provider   Last Visit   10/10/2024 Andres Pisano MD   Next Visit   Visit date not found Andres Pisano MD   ED visits in past 90 days: 1        Note composed:5:08 AM 10/11/2024

## 2024-10-11 NOTE — TELEPHONE ENCOUNTER
Refill Routing Note   Medication(s) are not appropriate for processing by [facility] for the following reason(s):        Med affordability    [facility] action(s):  Defer      Medication Therapy Plan: Flovent not covered. pharmacy requesting alternative      Appointments  past 12m or future 3m with PCP    Date Provider   Last Visit   10/10/2024 Andres Pisano MD   Next Visit   Visit date not found Andres Pisano MD   ED visits in past 90 days: 1        Note composed:6:47 PM 10/11/2024

## 2024-10-15 RX ORDER — FLUTICASONE PROPIONATE AND SALMETEROL 100; 50 UG/1; UG/1
1 POWDER RESPIRATORY (INHALATION) 2 TIMES DAILY
Refills: 0 | OUTPATIENT
Start: 2024-10-15 | End: 2025-10-15

## 2024-10-22 ENCOUNTER — TELEPHONE (OUTPATIENT)
Dept: PRIMARY CARE CLINIC | Facility: CLINIC | Age: 74
End: 2024-10-22
Payer: COMMERCIAL

## 2024-10-22 ENCOUNTER — HOSPITAL ENCOUNTER (OUTPATIENT)
Dept: RADIOLOGY | Facility: CLINIC | Age: 74
Discharge: HOME | End: 2024-10-22
Attending: STUDENT IN AN ORGANIZED HEALTH CARE EDUCATION/TRAINING PROGRAM
Payer: COMMERCIAL

## 2024-10-22 DIAGNOSIS — Z78.0 ASYMPTOMATIC MENOPAUSAL STATE: ICD-10-CM

## 2024-10-22 PROCEDURE — 77080 DXA BONE DENSITY AXIAL: CPT | Mod: TC,PO

## 2024-10-22 PROCEDURE — 77080 DXA BONE DENSITY AXIAL: CPT | Mod: 26,,, | Performed by: RADIOLOGY

## 2024-10-22 NOTE — TELEPHONE ENCOUNTER
"Patient unable to have MRI performed as insurance denied coverage.  Will attach copy of denial reason below for review.    Dr. Andres Pisano,     Thank you for ordering [id number] - MRI BRAIN WITHOUT CONTRAST for patient Anjali Morfin, MRN [id number]. Unfortunately, the patient's insurance, [employer] ALL OUT OF STATE, has denied the service due to Does Not Meet 3rd Party Guidelines, N/A. This is an automated In Basket notification that does not require a reply. For a more detailed explanation, or for questions regarding this insurance denial, send an In Basket message to the Pre Service Intake pool or call the [facility] Pre-Service department at [phone] and reference referral ID [id number].The Pre-Service department hours are M-F 8 a.m. to 5 p.m.       Thank you,     [facility]        Patient requesting to send message to provider.  Patient states "If he gives them a good enough reason as to why I need to have the MRI then they will approve it."      Please advise. Thank you.   "

## 2024-10-22 NOTE — TELEPHONE ENCOUNTER
Liu Howell Staff     ----- Message from Liu sent at 10/22/2024 12:26 PM CDT -----  Contact: self  Type: Needs Medical Advice  Who Called:  PT    Best Call Back Number: [phone]   Additional Information: Please call PT regarding MRI of brain.

## 2024-11-07 ENCOUNTER — OFFICE VISIT (OUTPATIENT)
Dept: PULMONOLOGY | Facility: CLINIC | Age: 74
End: 2024-11-07
Payer: COMMERCIAL

## 2024-11-07 VITALS
BODY MASS INDEX: 27.92 KG/M2 | HEIGHT: 60 IN | OXYGEN SATURATION: 100 % | SYSTOLIC BLOOD PRESSURE: 160 MMHG | DIASTOLIC BLOOD PRESSURE: 77 MMHG | WEIGHT: 142.19 LBS | HEART RATE: 79 BPM

## 2024-11-07 DIAGNOSIS — G47.33 OBSTRUCTIVE SLEEP APNEA SYNDROME: ICD-10-CM

## 2024-11-07 DIAGNOSIS — J44.89 ASTHMA-COPD OVERLAP SYNDROME: Primary | ICD-10-CM

## 2024-11-07 DIAGNOSIS — R40.0 DAYTIME SOMNOLENCE: ICD-10-CM

## 2024-11-07 PROCEDURE — 3288F FALL RISK ASSESSMENT DOCD: CPT | Mod: CPTII,S$GLB,, | Performed by: NURSE PRACTITIONER

## 2024-11-07 PROCEDURE — 3008F BODY MASS INDEX DOCD: CPT | Mod: CPTII,S$GLB,, | Performed by: NURSE PRACTITIONER

## 2024-11-07 PROCEDURE — 99999 PR PBB SHADOW E&M-EST. PATIENT-LVL IV: CPT | Mod: PBBFAC,,, | Performed by: NURSE PRACTITIONER

## 2024-11-07 PROCEDURE — 3078F DIAST BP <80 MM HG: CPT | Mod: CPTII,S$GLB,, | Performed by: NURSE PRACTITIONER

## 2024-11-07 PROCEDURE — 99204 OFFICE O/P NEW MOD 45 MIN: CPT | Mod: S$GLB,,, | Performed by: NURSE PRACTITIONER

## 2024-11-07 PROCEDURE — 1159F MED LIST DOCD IN RCRD: CPT | Mod: CPTII,S$GLB,, | Performed by: NURSE PRACTITIONER

## 2024-11-07 PROCEDURE — 3077F SYST BP >= 140 MM HG: CPT | Mod: CPTII,S$GLB,, | Performed by: NURSE PRACTITIONER

## 2024-11-07 PROCEDURE — 4010F ACE/ARB THERAPY RXD/TAKEN: CPT | Mod: CPTII,S$GLB,, | Performed by: NURSE PRACTITIONER

## 2024-11-07 PROCEDURE — 3044F HG A1C LEVEL LT 7.0%: CPT | Mod: CPTII,S$GLB,, | Performed by: NURSE PRACTITIONER

## 2024-11-07 PROCEDURE — 1101F PT FALLS ASSESS-DOCD LE1/YR: CPT | Mod: CPTII,S$GLB,, | Performed by: NURSE PRACTITIONER

## 2024-11-07 RX ORDER — LEVALBUTEROL INHALATION SOLUTION 1.25 MG/3ML
1 SOLUTION RESPIRATORY (INHALATION) EVERY 4 HOURS PRN
Qty: 125 ML | Refills: 11 | Status: SHIPPED | OUTPATIENT
Start: 2024-11-07 | End: 2025-11-07

## 2024-11-07 RX ORDER — PREDNISONE 10 MG/1
TABLET ORAL
Qty: 9 TABLET | Refills: 0 | Status: SHIPPED | OUTPATIENT
Start: 2024-11-07

## 2024-11-07 RX ORDER — AMLODIPINE BESYLATE 10 MG/1
10 TABLET ORAL
COMMUNITY
Start: 2024-09-30

## 2024-11-07 RX ORDER — BUDESONIDE, GLYCOPYRROLATE, AND FORMOTEROL FUMARATE 160; 9; 4.8 UG/1; UG/1; UG/1
2 AEROSOL, METERED RESPIRATORY (INHALATION) 2 TIMES DAILY
Qty: 10.7 G | Refills: 11 | Status: SHIPPED | OUTPATIENT
Start: 2024-11-07

## 2024-11-07 NOTE — PROGRESS NOTES
11/7/2024    Anjali Morfin  New Patient Consult    Chief Complaint   Patient presents with    Asthma    Wheezing    Shortness of Breath       HPI: 11/7/2024- established patient of pulmonologist Dr. Naman Lugo in [place], [place]. Previously treated with Breo and dupixent. Insurance stopped covering Dupixent and Breo stopped due to eye itching. Note reviewed 6/14/2023.   Complaint of chest tightness, varies in severity, worsened since moving to [place] from [place] in past 3 months. Associated with wheeze and shortness of breath.   Complaint of memory loss, onset 1 year after starting new blood pressure medication. Associated with daytime fatigue. EPWORTH SLEEPINESS SCORE 7.       Social Hx: lives with  and pet cats, currently retired, no known Asbestosis exposure, no Smoking Hx  Family Hx: no Lung Cancer, no COPD, no Asthma  Medical Hx: no previous pneumonia ; no previous shoulder/chest surgery      The chief compliant  problem is new to me  PFSH:  No past medical history on file.      No past surgical history on file.  Social History     Tobacco Use    Smoking status: Never    Smokeless tobacco: Never     No family history on file.  Review of patient's allergies indicates:   Allergen Reactions    Ciprofloxacin Itching     Contains polyethylene glycol    Levofloxacin Itching     Contains polyethylene glycol    Polyethylene glycol Itching and Shortness Of Breath    Polyethylene glycols Itching and Rash     Other Reaction(s): wheezing    Sulfa (sulfonamide antibiotics) Itching, Rash and Shortness Of Breath    Valsartan Itching and Shortness Of Breath     Contains polyethylene glycol    Acetaminophen      Due to ingredients    Albuterol sulfate Itching     Made asthma problems worse    Cephalexin Itching    Clindamycin Itching    Hydralazine analogues     Hydrochlorothiazide Itching    Ibuprofen      Due to ingredients    Sulfate ion     Celecoxib Itching and Rash    Doxycycline Itching and Rash    Oxycodone Itching and  Rash    Oxycodone-acetaminophen Itching and Rash    Penicillin Rash     Family history of it. Pt herself has not had reaction     I have reviewed past medical, family, and social history. I have reviewed previous nurse notes.        Performance Status:The patient's activity level is functions out of house.          Review of Systems   Constitutional: Negative for activity change, appetite change, chills, fever and unexpected weight change. Positive for fatigue and nocturnal diaphoresis  HENT: Negative for dental problem, postnasal drip, rhinorrhea, sinus pressure, sinus pain, sneezing, sore throat, trouble swallowing and voice change.    Respiratory: Negative for apnea,  and stridor.  Positive for cough, chest tightness, shortness of breath, wheezing  Cardiovascular: Negative for chest pain, palpitations and leg swelling.   Gastrointestinal: Negative for abdominal distention, abdominal pain, constipation and nausea.   Musculoskeletal: Negative for gait problem, myalgias and neck pain.   Skin: Negative for color change and pallor.   Allergic/Immunologic: Negative for environmental allergies or food allergies:   Neurological: Negative for dizziness, speech difficulty, weakness, light-headedness, numbness and headaches.   Hematological: Negative for adenopathy. Does not bruise/bleed easily.   Psychiatric/Behavioral: Negative for dysphoric mood and sleep disturbance. The patient is not nervous/anxious.           Exam:Comprehensive exam done. BP (!) 160/77 (BP Location: Right arm, Patient Position: Sitting)   Pulse 79   Ht 5' (1.524 m)   Wt 64.5 kg (142 lb 3.2 oz)   SpO2 100% Comment: on room air at rest  BMI 27.77 kg/m²   Exam included Vitals as listed  Constitutional:  oriented to person, place, and time. He appears well-developed. No distress.   Nose: Nose normal.   Mouth/Throat: Uvula is midline, oropharynx is clear and moist and mucous membranes are normal. No dental caries. No oropharyngeal exudate, posterior  oropharyngeal edema, posterior oropharyngeal erythema or tonsillar abscesses.  Mallapatti (M) score 3  Eyes: Pupils are equal, round, and reactive to light.   Neck: No JVD present. No thyromegaly present.   Cardiovascular: Normal rate, regular rhythm and normal heart sounds. Exam reveals no gallop and no friction rub.   No murmur heard.  Pulmonary/Chest: Effort normal and breath sounds normal. No accessory muscle usage or stridor. No apnea and no tachypnea. No respiratory distress, decreased breath sounds, wheezes, rhonchi, rales, or tenderness.   Abdominal: Soft. exhibits no mass. There is no tenderness. No hepatosplenomegaly, hernias and normoactive bowel sounds  Musculoskeletal: Normal range of motion. exhibits no edema.   Lymphadenopathy:     no cervical adenopathy.   Neurological:  alert and oriented to person, place, and time. not disoriented.   Skin: Skin is warm and dry. Capillary refill takes less 2 sec. No cyanosis or erythema. No pallor. Nails show no clubbing.   Psychiatric: normal mood and affect. behavior is normal. Judgment and thought content normal.       Radiographs (ct chest and cxr) reviewed: chest x-ray  patient imaging studies reviewed and interpreted independently. My personal interpretation of most resent images include:      XR CHEST 1 VIEW   09/11/2012 lungs clear      Labs: Patients labs reviewed including CBC and CMP  reviewed       Lab Results   Component Value Date    WBC 9.30 10/10/2024    RBC 3.86 (L) 10/10/2024    HGB 12.0 10/10/2024    HCT 38.6 10/10/2024     (H) 10/10/2024    MCH 31.1 (H) 10/10/2024    MCHC 31.1 (L) 10/10/2024    RDW 12.8 10/10/2024     10/10/2024    MPV 10.8 10/10/2024    GRAN 5.2 10/10/2024    GRAN 55.5 10/10/2024    LYMPH 2.4 10/10/2024    LYMPH 26.2 10/10/2024    MONO 0.7 10/10/2024    MONO 7.6 10/10/2024    EOS 0.9 (H) 10/10/2024    BASO 0.03 10/10/2024    EOSINOPHIL 10.0 (H) 10/10/2024    BASOPHIL 0.3 10/10/2024      Latest Reference Range &  Units 10/10/24 15:14   CO2 23 - 29 mmol/L 26     PFT results reviewed  Pulmonary Functions Testing Results:  7/14/2022   FEV1/FVC 67 86%   FEV1 1.06 L 56% 23% bronchodilator response  TLC 99%  DLC0 71%      Plan:  Clinical impression is apparently straight forward and impression with management as below.    Anjali was seen today for asthma, wheezing and shortness of breath.    Diagnoses and all orders for this visit:    Asthma-COPD overlap syndrome  -     levalbuterol (XOPENEX) 1.25 mg/3 mL nebulizer solution; Take 3 mLs (1.25 mg total) by nebulization every 4 (four) hours as needed for Wheezing or Shortness of Breath. Rescue  -     NEBULIZER FOR HOME USE  -     budesonide-glycopyr-formoterol (BREZTRI AEROSPHERE) 160-9-4.8 mcg/actuation HFAA; Inhale 2 puffs into the lungs 2 (two) times a day.  -     predniSONE (DELTASONE) 10 MG tablet; Take one pill a day for three days, repeat for shortness of breath  -     X-Ray Chest PA And Lateral; Future    Obstructive sleep apnea syndrome  -     Home Sleep Study; Future    Daytime somnolence  -     Home Sleep Study; Future          Follow up in about 3 months (around 2/7/2025), or if symptoms worsen or fail to improve.      Discussed with patient above for education the following:      Patient Instructions   Ordering overnight sleep study at home to evaluate for sleep apnea    If positive will order CPAP machine, notify clinic when machine is delivered to home to set up 31 days compliance appointment. You must use the machine for a least 4 hours every night.     Starting new asthma/COPD medication regiment  Breztri 2 puffs twice a day every day, rinse mouth after using due to risk for thrush if mouth or tongue has white sores contact clinic

## 2024-11-07 NOTE — PATIENT INSTRUCTIONS
Ordering overnight sleep study at home to evaluate for sleep apnea    If positive will order CPAP machine, notify clinic when machine is delivered to home to set up 31 days compliance appointment. You must use the machine for a least 4 hours every night.     Starting new asthma/COPD medication regiment  Breztri 2 puffs twice a day every day, rinse mouth after using due to risk for thrush if mouth or tongue has white sores contact clinic

## 2025-01-02 ENCOUNTER — OFFICE VISIT (OUTPATIENT)
Dept: URGENT CARE | Facility: CLINIC | Age: 75
End: 2025-01-02
Payer: COMMERCIAL

## 2025-01-02 VITALS
HEART RATE: 87 BPM | RESPIRATION RATE: 18 BRPM | OXYGEN SATURATION: 96 % | TEMPERATURE: 99 F | HEIGHT: 60 IN | SYSTOLIC BLOOD PRESSURE: 152 MMHG | DIASTOLIC BLOOD PRESSURE: 73 MMHG | WEIGHT: 132 LBS | BODY MASS INDEX: 25.91 KG/M2

## 2025-01-02 DIAGNOSIS — R05.9 COUGH, UNSPECIFIED TYPE: ICD-10-CM

## 2025-01-02 DIAGNOSIS — L30.9 DERMATITIS: ICD-10-CM

## 2025-01-02 DIAGNOSIS — R89.4 INFLUENZA A VIRUS NOT DETECTED: ICD-10-CM

## 2025-01-02 DIAGNOSIS — J44.1 ASTHMA EXACERBATION WITH COPD (CHRONIC OBSTRUCTIVE PULMONARY DISEASE): Primary | ICD-10-CM

## 2025-01-02 DIAGNOSIS — Z20.822 COVID-19 VIRUS NOT DETECTED: ICD-10-CM

## 2025-01-02 LAB
CTP QC/QA: YES
CTP QC/QA: YES
FLUAV AG NPH QL: NEGATIVE
FLUBV AG NPH QL: NEGATIVE
SARS-COV-2 AG RESP QL IA.RAPID: NEGATIVE

## 2025-01-02 RX ORDER — METHYLPREDNISOLONE 4 MG/1
TABLET ORAL
Qty: 21 EACH | Refills: 0 | Status: SHIPPED | OUTPATIENT
Start: 2025-01-02

## 2025-01-02 NOTE — PROGRESS NOTES
Subjective:      Patient ID: Anjali Morfin is a 74 y.o. female.    Vitals:  height is 5' (1.524 m) and weight is 59.9 kg (132 lb). Her oral temperature is 98.5 °F (36.9 °C). Her blood pressure is 152/73 (abnormal) and her pulse is 87. Her respiration is 18 and oxygen saturation is 96%.     Chief Complaint: URI    Cough, congestion, and wheezing for the past 2 weeks.  History of asthma and COPD overlap.  Noncompliant with breztri.  States causes glaucoma.  Declines use of levalbuterol nebulizer treatments.  States does not want to use it.  Also reports she does not trust doctors.  She has a another complaint of chronic dermatitis.  Dermatitis caused by Cat.  Brother recently passed, and she has been tending to pet.    URI   This is a new problem. The current episode started in the past 7 days. The problem has been gradually improving. There has been no fever. The cough is Productive of sputum. Associated symptoms include congestion, coughing, a rash and wheezing. Pertinent negatives include no ear pain, headaches, nausea, plugged ear sensation, rhinorrhea, sinus pain, sneezing, sore throat or swollen glands. Treatments tried: otc cold. The treatment provided mild relief.       Constitution: Negative for fever.   HENT:  Positive for congestion. Negative for ear pain, sinus pain and sore throat.    Respiratory:  Positive for cough, sputum production and wheezing.    Gastrointestinal:  Negative for nausea.   Skin:  Positive for rash.   Allergic/Immunologic: Negative for sneezing.   Neurological:  Negative for headaches.      Objective:     Physical Exam   Constitutional: She is oriented to person, place, and time. She is cooperative.  Non-toxic appearance. She does not appear ill. No distress.   HENT:   Head: Normocephalic and atraumatic.   Ears:   Right Ear: External ear normal.   Left Ear: External ear normal.   Nose: Nose normal.   Mouth/Throat: Uvula is midline, oropharynx is clear and moist and mucous membranes  are normal. Mucous membranes are moist. Oropharynx is clear.   Eyes: Conjunctivae and lids are normal. Pupils are equal, round, and reactive to light. Extraocular movement intact   Neck: Trachea normal and phonation normal. Neck supple.   Cardiovascular: Normal rate, regular rhythm, normal heart sounds and normal pulses.   Pulmonary/Chest: Effort normal. She has wheezes in the left lower field. She has no rales. She exhibits no tenderness.   Abdominal: Normal appearance.   Musculoskeletal: Normal range of motion.         General: Normal range of motion.   Lymphadenopathy:     She has no cervical adenopathy.   Neurological: no focal deficit. She is alert, oriented to person, place, and time and at baseline. She has normal motor skills, normal sensation and intact cranial nerves (2-12). Gait and coordination normal. GCS eye subscore is 4. GCS verbal subscore is 5. GCS motor subscore is 6.   Skin: Skin is warm, dry, not diaphoretic and rash. Capillary refill takes 2 to 3 seconds.        Psychiatric: She experiences Normal attention and Normal perception. Her speech is normal and behavior is normal. Mood, judgment and thought content normal.   Nursing note and vitals reviewed.      Assessment:     1. Asthma exacerbation with COPD (chronic obstructive pulmonary disease)    2. Cough, unspecified type    3. Dermatitis        Plan:       Asthma exacerbation with COPD (chronic obstructive pulmonary disease)  -     methylPREDNISolone (MEDROL DOSEPACK) 4 mg tablet; use as directed  Dispense: 21 each; Refill: 0    Cough, unspecified type  -     POCT Influenza A/B  -     SARS Coronavirus 2 Antigen, POCT Manual Read    Dermatitis  -     methylPREDNISolone (MEDROL DOSEPACK) 4 mg tablet; use as directed  Dispense: 21 each; Refill: 0      Complicated patient with numerous drug allergies.  Noncompliant with majority of treatment plans.  She is constantly switching doctors, currently requesting new primary care and pulmonologist.  She  is requesting steroids for asthma/COPD exacerbation and dermatitis.  However states she will not use breztri because steroids cause glaucoma.  We will not use Xopenex nebulized solution, but is requesting refill on Xopenex inhaler.  She has 11 refills currently ordered.

## 2025-01-16 ENCOUNTER — OFFICE VISIT (OUTPATIENT)
Dept: URGENT CARE | Facility: CLINIC | Age: 75
End: 2025-01-16
Payer: COMMERCIAL

## 2025-01-16 VITALS
OXYGEN SATURATION: 97 % | HEART RATE: 92 BPM | TEMPERATURE: 99 F | BODY MASS INDEX: 25.52 KG/M2 | RESPIRATION RATE: 16 BRPM | SYSTOLIC BLOOD PRESSURE: 150 MMHG | WEIGHT: 130 LBS | DIASTOLIC BLOOD PRESSURE: 88 MMHG | HEIGHT: 60 IN

## 2025-01-16 DIAGNOSIS — R05.1 ACUTE COUGH: ICD-10-CM

## 2025-01-16 DIAGNOSIS — R06.02 SHORTNESS OF BREATH: ICD-10-CM

## 2025-01-16 DIAGNOSIS — Z20.822 COVID-19 VIRUS NOT DETECTED: ICD-10-CM

## 2025-01-16 DIAGNOSIS — R06.2 WHEEZING: ICD-10-CM

## 2025-01-16 DIAGNOSIS — H61.23 BILATERAL IMPACTED CERUMEN: ICD-10-CM

## 2025-01-16 DIAGNOSIS — J45.21 MILD INTERMITTENT ASTHMA WITH EXACERBATION: Primary | ICD-10-CM

## 2025-01-16 PROCEDURE — 94640 AIRWAY INHALATION TREATMENT: CPT | Mod: S$GLB,,, | Performed by: EMERGENCY MEDICINE

## 2025-01-16 PROCEDURE — 99214 OFFICE O/P EST MOD 30 MIN: CPT | Mod: 25,S$GLB,, | Performed by: NURSE PRACTITIONER

## 2025-01-16 PROCEDURE — 71046 X-RAY EXAM CHEST 2 VIEWS: CPT | Mod: S$GLB,,, | Performed by: RADIOLOGY

## 2025-01-16 PROCEDURE — 69209 REMOVE IMPACTED EAR WAX UNI: CPT | Mod: 50,S$GLB,, | Performed by: NURSE PRACTITIONER

## 2025-01-16 RX ORDER — LEVALBUTEROL TARTRATE 45 UG/1
1-2 AEROSOL, METERED ORAL EVERY 4 HOURS PRN
Qty: 15 G | Refills: 0 | Status: SHIPPED | OUTPATIENT
Start: 2025-01-16 | End: 2026-01-16

## 2025-01-16 RX ORDER — METHYLPREDNISOLONE 4 MG/1
TABLET ORAL
Qty: 21 EACH | Refills: 0 | Status: SHIPPED | OUTPATIENT
Start: 2025-01-16 | End: 2025-01-16

## 2025-01-16 RX ORDER — LEVALBUTEROL INHALATION SOLUTION 1.25 MG/3ML
1 SOLUTION RESPIRATORY (INHALATION) EVERY 4 HOURS PRN
Qty: 75 ML | Refills: 0 | Status: SHIPPED | OUTPATIENT
Start: 2025-01-16 | End: 2026-01-16

## 2025-01-16 RX ORDER — LEVALBUTEROL INHALATION SOLUTION 1.25 MG/3ML
1.25 SOLUTION RESPIRATORY (INHALATION)
Status: COMPLETED | OUTPATIENT
Start: 2025-01-16 | End: 2025-01-16

## 2025-01-16 RX ORDER — GUAIFENESIN 200 MG/1
400 TABLET ORAL EVERY 4 HOURS PRN
Qty: 30 TABLET | Refills: 0 | Status: SHIPPED | OUTPATIENT
Start: 2025-01-16 | End: 2025-01-23

## 2025-01-16 RX ORDER — METHYLPREDNISOLONE 4 MG/1
TABLET ORAL
Qty: 21 EACH | Refills: 0 | Status: SHIPPED | OUTPATIENT
Start: 2025-01-16

## 2025-01-16 RX ORDER — BENZONATATE 100 MG/1
100 CAPSULE ORAL 3 TIMES DAILY PRN
Qty: 21 CAPSULE | Refills: 0 | Status: SHIPPED | OUTPATIENT
Start: 2025-01-16 | End: 2025-01-23

## 2025-01-16 RX ADMIN — LEVALBUTEROL INHALATION SOLUTION 1.25 MG: 1.25 SOLUTION RESPIRATORY (INHALATION) at 01:01

## 2025-01-16 NOTE — PATIENT INSTRUCTIONS
A nebulizer machine has been ordered for you along with some nebulized medications.  You can  the nebulizer machine and nebulizer tubing at the following address:      [facility] [employer]  [facility] [address] [address]  [phone]      Increase clear fluid intake  Stop all current over the counter cough, cold, flu medicine  Tylenol/motrin otc for fever or pain  Use Astelin nasal spray and Ninja Cof-D syrup for sinus congestion and pressure.  Take Mucinex   as needed for chest congestion and coughing. Take mucinex with a full glass of water at each dose  May use Tessalon Perles as needed for excessive daytime coughing  Add a humidifier to your room at bedtime for respiratory comfort.  Continue all home medications  Follow up with PCP  Go immediately to the nearest emergency room for shortness of breath, chest pain,  or other emergent concern.  Return to clinic for new, worse, or unresolving symptoms

## 2025-01-16 NOTE — PROCEDURES
"Ear Cerumen Removal    Date/Time: 1/16/2025 11:30 AM    Performed by: Cornelio Gomez Jr., FNP-C  Authorized by: Cornelio Gomez Jr., BEAN    Time out: Immediately prior to procedure a "time out" was called to verify the correct patient, procedure, equipment, support staff and site/side marked as required.    Consent Done?:  Yes (Verbal)    Local anesthetic:  None  Location details:  Both ears  Procedure type: irrigation    Cerumen  Removal Results:  Cerumen partially removed  Patient tolerance:  Patient tolerated the procedure well with no immediate complications     Right ear cerumen cleared with flushing.  Right TM unremarkable.  Left ear cerumen remains intact after multiple flushing attempts.  Referral for ENT sent    "

## 2025-01-16 NOTE — PROGRESS NOTES
Subjective:      Patient ID: Anjali Morfin is a 74 y.o. female.    Vitals:  height is 5' (1.524 m) and weight is 59 kg (130 lb). Her oral temperature is 98.5 °F (36.9 °C). Her blood pressure is 150/88 (abnormal) and her pulse is 92. Her respiration is 16 and oxygen saturation is 97%.     Chief Complaint: Headache (Headache, sore throat and shortness of breath, symptoms began at 4am this morning.)    74-year-old female seen today for cough, congestion, shortness for breath.  She reports symptoms began this morning and seemed to be worsening.  She denies any fever.    Headache   This is a new problem. The current episode started today. The problem occurs constantly. The problem has been gradually improving. The pain is located in the Frontal region. The pain does not radiate. The pain quality is similar to prior headaches. The quality of the pain is described as aching. The pain is at a severity of 5/10. The pain is moderate. Associated symptoms include coughing and a sore throat. Pertinent negatives include no dizziness, fever, nausea or vomiting. She has tried nothing for the symptoms.       Constitution: Negative for chills and fever.   HENT:  Positive for congestion and sore throat.    Neck: Negative for painful lymph nodes.   Cardiovascular:  Negative for chest pain, palpitations and sob on exertion.   Respiratory:  Positive for cough and wheezing. Negative for sputum production and shortness of breath.    Gastrointestinal:  Negative for nausea and vomiting.   Musculoskeletal:  Negative for muscle ache.   Skin:  Negative for rash.   Neurological:  Positive for headaches. Negative for dizziness, light-headedness, passing out, disorientation and altered mental status.   Hematologic/Lymphatic: Negative for swollen lymph nodes.   Psychiatric/Behavioral:  Negative for altered mental status, disorientation and confusion.       Objective:     Physical Exam   Constitutional: She is oriented to person, place, and time.  She appears well-developed. She is cooperative.  Non-toxic appearance. She does not appear ill. No distress.   HENT:   Head: Normocephalic and atraumatic.   Ears:   Right Ear: External ear normal. Decreased hearing is noted. impacted cerumen  Left Ear: External ear normal. Decreased hearing is noted. impacted cerumen  Nose: Congestion present. No mucosal edema, rhinorrhea or nasal deformity. No epistaxis. Right sinus exhibits no maxillary sinus tenderness and no frontal sinus tenderness. Left sinus exhibits no maxillary sinus tenderness and no frontal sinus tenderness.   Mouth/Throat: Uvula is midline, oropharynx is clear and moist and mucous membranes are normal. Mucous membranes are moist. No trismus in the jaw. Normal dentition. No uvula swelling. No oropharyngeal exudate, posterior oropharyngeal edema or posterior oropharyngeal erythema.   Eyes: Conjunctivae and lids are normal. No scleral icterus.   Neck: Trachea normal and phonation normal. Neck supple. No edema present. No erythema present. No neck rigidity present.   Cardiovascular: Normal rate, regular rhythm, normal heart sounds and normal pulses.   Pulmonary/Chest: Effort normal. No accessory muscle usage or stridor. No respiratory distress. She has decreased breath sounds in the right lower field and the left lower field. She has wheezes in the left middle field and the left lower field. She has rhonchi in the left upper field and the left middle field.   Abdominal: Normal appearance.   Musculoskeletal: Normal range of motion.         General: No deformity. Normal range of motion.   Neurological: no focal deficit. She is alert and oriented to person, place, and time. She exhibits normal muscle tone. Coordination normal.   Skin: Skin is warm, dry, intact, not diaphoretic and not pale. Capillary refill takes 2 to 3 seconds.   Psychiatric: Her speech is normal and behavior is normal. Judgment and thought content normal.   Nursing note and vitals  reviewed.      Assessment:     1. Mild intermittent asthma with exacerbation    2. Wheezing    3. Shortness of breath    4. COVID-19 virus not detected    5. Bilateral impacted cerumen    6. Acute cough        Plan:       Mild intermittent asthma with exacerbation  -     levalbuterol (XOPENEX HFA) 45 mcg/actuation inhaler; Inhale 1-2 puffs into the lungs every 4 (four) hours as needed for Wheezing. Rescue  Dispense: 15 g; Refill: 0  -     NEBULIZER KIT (SUPPLIES) FOR HOME USE  -     NEBULIZER FOR HOME USE  -     levalbuterol (XOPENEX) 1.25 mg/3 mL nebulizer solution; Take 3 mLs (1.25 mg total) by nebulization every 4 (four) hours as needed for Wheezing or Shortness of Breath. Rescue  Dispense: 75 mL; Refill: 0    Wheezing  -     XR CHEST PA AND LATERAL; Future; Expected date: 01/16/2025  -     Inhalation Treatment; Future; Expected date: 01/16/2025  -     levalbuterol nebulizer solution 1.25 mg  -     levalbuterol (XOPENEX HFA) 45 mcg/actuation inhaler; Inhale 1-2 puffs into the lungs every 4 (four) hours as needed for Wheezing. Rescue  Dispense: 15 g; Refill: 0  -     NEBULIZER KIT (SUPPLIES) FOR HOME USE  -     NEBULIZER FOR HOME USE  -     levalbuterol (XOPENEX) 1.25 mg/3 mL nebulizer solution; Take 3 mLs (1.25 mg total) by nebulization every 4 (four) hours as needed for Wheezing or Shortness of Breath. Rescue  Dispense: 75 mL; Refill: 0    Shortness of breath  -     SARS Coronavirus 2 Antigen, POCT Manual Read  -     POCT Influenza A/B Rapid Antigen  -     XR CHEST PA AND LATERAL; Future; Expected date: 01/16/2025  -     Inhalation Treatment; Future; Expected date: 01/16/2025  -     levalbuterol nebulizer solution 1.25 mg  -     levalbuterol (XOPENEX HFA) 45 mcg/actuation inhaler; Inhale 1-2 puffs into the lungs every 4 (four) hours as needed for Wheezing. Rescue  Dispense: 15 g; Refill: 0  -     NEBULIZER KIT (SUPPLIES) FOR HOME USE  -     NEBULIZER FOR HOME USE  -     levalbuterol (XOPENEX) 1.25 mg/3 mL  nebulizer solution; Take 3 mLs (1.25 mg total) by nebulization every 4 (four) hours as needed for Wheezing or Shortness of Breath. Rescue  Dispense: 75 mL; Refill: 0    COVID-19 virus not detected    Bilateral impacted cerumen  -     Ear wax removal    Acute cough  -     guaiFENesin 200 mg tablet; Take 2 tablets (400 mg total) by mouth every 4 (four) hours as needed for Congestion.  Dispense: 30 tablet; Refill: 0  -     benzonatate (TESSALON) 100 MG capsule; Take 1 capsule (100 mg total) by mouth 3 (three) times daily as needed for Cough.  Dispense: 21 capsule; Refill: 0    Flu negative    X-ray-Impression:     Aortic atherosclerosis and ectasia otherwise negative chest x-ray        The test results and physical exam findings were discussed with the patient and all questions answered.  Increased air flow auscultated after nebulizer treatment.  Wheezing resolved.  Referral sent to ENT for cerumen removal.  We discussed symptom monitoring, conservative care methods, medication use, and follow up orders.  She verbalized understanding and agreement with the plan of care.

## 2025-01-31 NOTE — PROGRESS NOTES
ALLERGY & IMMUNOLOGY CLINIC -  NEW PATIENT     HISTORY OF PRESENT ILLNESS     Patient ID: Anjali Morfin is a 74 y.o. female    CC: No chief complaint on file.      HPI: Anjali Morfin is a 74 y.o. 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 Was diagnosed with asthma  Breztri 160 BID      H/o Eczema: ***denies  H/o Rhinitis: ***denies  Oral Allergy: *** denies  Food Allergy: ***denies  Venom Allergy: ***denies  Latex Allergy: ***denies  Env/Occ: ***denies any environmental or occupational exposures     REVIEW OF SYSTEMS     CONST: no F/C/NS, no unintentional weight changes  EYES: Denies itchy/watery eyes, denies injected eyes  EARS: no hearing loss, no sensation of fullness  NOSE: no congestion, no rhinorrhea, no discharge, no itching, no sneezing  PULM: no SOB, no wheezing, no cough, no snoring  CV: no CP, no palpitations, no leg swelling  GI: no dysphagia, no heartburn, no pain, no N/V/D  DERM: no rashes, no skin breaks    Balance of review of systems negative except as mentioned above     MEDICAL HISTORY     MedHx: active problems reviewed  SurgHx: No past surgical history on file.    SocHx:   -Smoking: Denies***  -Pets:  -Mold/Water Damage:  -School/Work:     FamHx:   -Asthma:  -Atopic Dermatitis:  -Rhinitis:  -Food Allergy:    Otherwise no Family History of asthma, allergic rhinitis, atopic dermatitis, drug allergy, food allergy, venom allergy or immune deficiency.     Allergies: see below  Medications: MAR reviewed       PHYSICAL EXAM     VS: There were no vitals taken for this visit.  GENERAL: awake, alert, cooperative with exam  EYES: PERRL, EOMI, no conjunctival injection, no discharge, no infraorbital shiners  EARS: external auditory canals normal B/L, TM normal B/L  NOSE: NT ***+ and *** B/L, ***stringing mucous, no polyps  ORAL: MMM, no ulcers, no thrush, no  cobblestoning  NECK: no cervical or submandibular LAD  LUNGS: CTAB, no w/r/c, no increased WOB  HEART: Normal Rate and regular rhythm, normal S1/S2, no m/g/r  EXTREMITIES: +2 distal pulses, no c/c/e  DERM: no rashes, no skin breaks  NEURO: normal gait, no facial asymmetry     LABORATORY STUDIES     Component      Latest Ref Rng 10/10/2024   Eos #      0.0 - 0.5 K/uL 0.9 (H)         PULMONARY FUNCTION     PFT results reviewed  Pulmonary Functions Testing Results:  7/14/2022   FEV1/FVC 67 86%   FEV1 1.06 L 56% 23% bronchodilator response  TLC 99%  DLC0 71%     CHART REVIEW     ***     ASSESSMENT/PLAN     Anjali Morfin is a 74 y.o. female with       No diagnosis found.      Follow up: ***      Alireza Cordoba MD    I spent a total of *** minutes on the day of the visit. This includes face to face time and non-face to face time preparing to see the patient (eg, review of tests), obtaining and/or reviewing separately obtained history, documenting clinical information in the electronic or other health record, independently interpreting results and communicating results to the patient/family/caregiver, or care coordinator.

## 2025-02-03 ENCOUNTER — OFFICE VISIT (OUTPATIENT)
Dept: ALLERGY | Facility: CLINIC | Age: 75
End: 2025-02-03
Payer: COMMERCIAL

## 2025-02-03 VITALS — BODY MASS INDEX: 26.7 KG/M2 | HEIGHT: 60 IN | WEIGHT: 136 LBS

## 2025-02-03 DIAGNOSIS — J82.83 EOSINOPHILIC ASTHMA: ICD-10-CM

## 2025-02-03 DIAGNOSIS — L30.1 DYSHIDROTIC ECZEMA: ICD-10-CM

## 2025-02-03 DIAGNOSIS — L20.89 FLEXURAL ATOPIC DERMATITIS: Primary | ICD-10-CM

## 2025-02-03 PROCEDURE — 1126F AMNT PAIN NOTED NONE PRSNT: CPT | Mod: CPTII,S$GLB,, | Performed by: STUDENT IN AN ORGANIZED HEALTH CARE EDUCATION/TRAINING PROGRAM

## 2025-02-03 PROCEDURE — 99999 PR PBB SHADOW E&M-EST. PATIENT-LVL IV: CPT | Mod: PBBFAC,,, | Performed by: STUDENT IN AN ORGANIZED HEALTH CARE EDUCATION/TRAINING PROGRAM

## 2025-02-03 PROCEDURE — 99204 OFFICE O/P NEW MOD 45 MIN: CPT | Mod: S$GLB,,, | Performed by: STUDENT IN AN ORGANIZED HEALTH CARE EDUCATION/TRAINING PROGRAM

## 2025-02-03 PROCEDURE — 1159F MED LIST DOCD IN RCRD: CPT | Mod: CPTII,S$GLB,, | Performed by: STUDENT IN AN ORGANIZED HEALTH CARE EDUCATION/TRAINING PROGRAM

## 2025-02-03 PROCEDURE — 3008F BODY MASS INDEX DOCD: CPT | Mod: CPTII,S$GLB,, | Performed by: STUDENT IN AN ORGANIZED HEALTH CARE EDUCATION/TRAINING PROGRAM

## 2025-02-03 PROCEDURE — 4010F ACE/ARB THERAPY RXD/TAKEN: CPT | Mod: CPTII,S$GLB,, | Performed by: STUDENT IN AN ORGANIZED HEALTH CARE EDUCATION/TRAINING PROGRAM

## 2025-02-03 RX ORDER — MOMETASONE FUROATE 1 MG/G
CREAM TOPICAL DAILY PRN
Qty: 45 G | Refills: 3 | Status: SHIPPED | OUTPATIENT
Start: 2025-02-03 | End: 2025-02-17

## 2025-02-03 RX ORDER — MUPIROCIN 20 MG/G
OINTMENT TOPICAL 2 TIMES DAILY
Qty: 30 G | Refills: 2 | Status: SHIPPED | OUTPATIENT
Start: 2025-02-03

## 2025-02-03 NOTE — PROGRESS NOTES
ALLERGY & IMMUNOLOGY CLINIC -  NEW PATIENT     HISTORY OF PRESENT ILLNESS     Patient ID: Anjali Morfin is a 74 y.o. female    CC:   Chief Complaint   Patient presents with    Eczema       HPI: Anjali Morfin is a 74 y.o. female presents for evaluation of:    02/03/2025  Allergies: Presents today to establish care for longstanding atopic dermatitis diagnosed around 3 months of age. Has been mainly well controlled and presents as she moved here from [place], [place] approximately a year and half ago; looking to establish care for eczema. Applies Aveeno lotion daily and sometimes multiple times per day. Uses Hydrocortisone 2.5% as needed or 1% as well which she states will relieve symptoms within 2-3 days of starting. Still with dryness to her hands, was recently prescribed OCS which she states helped relieve baseline eczema symptoms. Also with history of eosinophilic asthma for which she is followed by Pulm. Uses Breztri 160 BID and albuterol PRN. Denies allergic rhinitis and conjunctivitis. Previously on Dupixent for 6 months but discontinued due to insurance not covering any longer     REVIEW OF SYSTEMS     CONST: no F/C/NS, no unintentional weight changesBalance of review of systems negative except as mentioned above     MEDICAL HISTORY     MedHx: active problems reviewed  SurgHx: No past surgical history on file.    SocHx:   -Smoking: Denies  -Pets: cat  FamHx:   no Family History of asthma, allergic rhinitis, atopic dermatitis, drug allergy, food allergy, venom allergy or immune deficiency.     Allergies: see below  Medications: MAR reviewed       PHYSICAL EXAM     VS: Ht 5' (1.524 m)   Wt 61.7 kg (136 lb 0.4 oz)   BMI 26.57 kg/m²   GENERAL: awake, alert, cooperative with exam  LUNGS: CTAB, no w/r/c, no increased WOB  HEART: Normal Rate and regular rhythm, normal S1/S2, no m/g/r  EXTREMITIES: +2 distal pulses, no c/c/e  DERM: Xerosis to dorsum of hand with small excoriations, L>R     LABORATORY STUDIES      Component      Latest Ref Rng 10/10/2024   Eos #      0.0 - 0.5 K/uL 0.9 (H)         PULMONARY FUNCTION     PFT results reviewed  Pulmonary Functions Testing Results:  7/14/2022   FEV1/FVC 67 86%   FEV1 1.06 L 56% 23% bronchodilator response  TLC 99%  DLC0 71%     ASSESSMENT/PLAN     Anjali Morfin is a 74 y.o. female with       1. Flexural atopic dermatitis    2. Dyshidrotic eczema    3. Eosinophilic asthma      Well controlled at this time from an eczema perspective; discussed that higher potency topical steroids can be applied for longer durations to hands as needed  Recommended changing from lotions-->Creams for baseline moisturizer   Does not need refill of Hydrocortisone at this time  Prescribed Mupirocin for as needed use for open skin lesions related to eczema  Ashtma well controlled; consider dupixent at future visits (FDA approved for eczema and asthma)    Follow up: 1 year      Alireza Cordoba MD    I spent a total of 45 minutes on the day of the visit. This includes face to face time and non-face to face time preparing to see the patient (eg, review of tests), obtaining and/or reviewing separately obtained history, documenting clinical information in the electronic or other health record, independently interpreting results and communicating results to the patient/family/caregiver, or care coordinator.

## 2025-02-13 ENCOUNTER — OFFICE VISIT (OUTPATIENT)
Dept: PULMONOLOGY | Facility: CLINIC | Age: 75
End: 2025-02-13
Payer: COMMERCIAL

## 2025-02-13 VITALS
DIASTOLIC BLOOD PRESSURE: 85 MMHG | SYSTOLIC BLOOD PRESSURE: 166 MMHG | HEIGHT: 60 IN | BODY MASS INDEX: 26.29 KG/M2 | HEART RATE: 98 BPM | OXYGEN SATURATION: 98 % | WEIGHT: 133.94 LBS

## 2025-02-13 DIAGNOSIS — R06.2 WHEEZING: ICD-10-CM

## 2025-02-13 DIAGNOSIS — J45.21 MILD INTERMITTENT ASTHMA WITH EXACERBATION: ICD-10-CM

## 2025-02-13 DIAGNOSIS — R06.02 SHORTNESS OF BREATH: ICD-10-CM

## 2025-02-13 PROCEDURE — 99999 PR PBB SHADOW E&M-EST. PATIENT-LVL V: CPT | Mod: PBBFAC,,, | Performed by: NURSE PRACTITIONER

## 2025-02-13 RX ORDER — LEVALBUTEROL TARTRATE 45 UG/1
1-2 AEROSOL, METERED ORAL EVERY 4 HOURS PRN
Qty: 15 G | Refills: 11 | Status: SHIPPED | OUTPATIENT
Start: 2025-02-13 | End: 2026-02-13

## 2025-02-13 NOTE — PATIENT INSTRUCTIONS
Use Breztri 1-2 puffs 1-2 times a day. You can use every day with low risk of side effects from steroids in medication    It is better to use inhaled steroids in Breztri than high doses in oral or IV medications.     Continue levalbuterol in nebulizer or rescue inhaler as needed every 4 hours.    We discuss Dupixent biologic medication for skin and asthma. Contact clinic if interested.     We discussed a CT of chest to evaluate complaint of persistent cough.

## 2025-02-13 NOTE — PROGRESS NOTES
2/13/2025    Anjali Morfin  Office Note    Chief Complaint   Patient presents with    3m f/u     Shortness of Breath       HPI:   2/13/2025- decided not to start Breztri, concern over risk for worsening glaucoma of eyes, agreed to take 1 puff once daily to see if it helps.   Declined sleep apnea testing.   Complaint of shortness of breath with exertion around the house, improves with rest. Has occasional non productive cough, had URI treated at urgent care with oral prednisone with benefit.   no chest tightness or wheeze.     11/7/2024- established patient of pulmonologist Dr. Naman Lugo in [place], [place]. Previously treated with Breo and dupixent. Insurance stopped covering Dupixent and Breo stopped due to eye itching. Note reviewed 6/14/2023.   Complaint of chest tightness, varies in severity, worsened since moving to [place] from [place] in past 3 months. Associated with wheeze and shortness of breath.   Complaint of memory loss, onset 1 year after starting new blood pressure medication. Associated with daytime fatigue. EPWORTH SLEEPINESS SCORE 7.       Social Hx: lives with  and pet cats, currently retired, no known Asbestosis exposure, no Smoking Hx  Family Hx: no Lung Cancer, no COPD, no Asthma  Medical Hx: no previous pneumonia ; no previous shoulder/chest surgery      The chief compliant  problem is persistent  PFSH:  No past medical history on file.      No past surgical history on file.  Social History     Tobacco Use    Smoking status: Never    Smokeless tobacco: Never     No family history on file.  Review of patient's allergies indicates:   Allergen Reactions    Ciprofloxacin Itching     Contains polyethylene glycol    Levofloxacin Itching     Contains polyethylene glycol    Polyethylene glycol Itching and Shortness Of Breath    Polyethylene glycols Itching and Rash     Other Reaction(s): wheezing    Sulfa (sulfonamide antibiotics) Itching, Rash and Shortness Of Breath    Valsartan Itching and Shortness  Of Breath     Contains polyethylene glycol    Acetaminophen      Due to ingredients    Albuterol sulfate Itching     Made asthma problems worse    Cephalexin Itching    Clindamycin Itching    Hydralazine analogues     Hydrochlorothiazide Itching    Ibuprofen      Due to ingredients    Sulfate ion     Celecoxib Itching and Rash    Doxycycline Itching and Rash    Oxycodone Itching and Rash    Oxycodone-acetaminophen Itching and Rash    Penicillin Rash     Family history of it. Pt herself has not had reaction     I have reviewed past medical, family, and social history. I have reviewed previous nurse notes.        Performance Status:The patient's activity level is functions out of house.          Review of Systems   Constitutional: Negative for activity change, appetite change, chills, fever and unexpected weight change. Positive for fatigue and nocturnal diaphoresis  HENT: Negative for dental problem, postnasal drip, rhinorrhea, sinus pressure, sinus pain, sneezing, sore throat, trouble swallowing and voice change.    Respiratory: Negative for apnea,  and stridor.  Positive for cough, chest tightness, shortness of breath, wheezing  Cardiovascular: Negative for chest pain, palpitations and leg swelling.   Gastrointestinal: Negative for abdominal distention, abdominal pain, constipation and nausea.   Musculoskeletal: Negative for gait problem, myalgias and neck pain.   Skin: Negative for color change and pallor.   Allergic/Immunologic: Negative for environmental allergies or food allergies:   Neurological: Negative for dizziness, speech difficulty, weakness, light-headedness, numbness and headaches.   Hematological: Negative for adenopathy. Does not bruise/bleed easily.   Psychiatric/Behavioral: Negative for dysphoric mood and sleep disturbance. The patient is not nervous/anxious.           Exam:Comprehensive exam done. BP (!) 166/85 (BP Location: Right arm, Patient Position: Sitting)   Pulse 98   Ht 5' (1.524 m)    Wt 60.7 kg (133 lb 14.9 oz)   SpO2 98% Comment: on room air at rest  BMI 26.16 kg/m²   Exam included Vitals as listed, and patient's appearance and affect and alertness and mood, oral exam for yeast and hygiene and pharynx lesions and Mallapatti (M) score, neck with inspection for jvd and masses and thyroid abnormalities and lymph nodes (supraclavicular and infraclavicular nodes and axillary also examined and noted if abn), chest exam included symmetry and effort and fremitus and percussion and auscultation, cardiac exam included rhythm and gallops and murmur and rubs and jvd and edema, abdominal exam for mass and hepatosplenomegaly and tenderness and hernias and bowel sounds, Musculoskeletal exam with muscle tone and posture and mobility/gait and  strength, and skin for rashes and cyanosis and pallor and turgor, extremity for clubbing.  Findings were normal except for pertinent findings listed below:   M3  Breath sounds clear      Radiographs (ct chest and cxr) reviewed: chest x-ray  patient imaging studies reviewed and interpreted independently. My personal interpretation of most resent images include:      XR CHEST 1 VIEW   09/11/2012 lungs clear      Labs: Patients labs reviewed including CBC and C02  reviewed       Lab Results   Component Value Date    WBC 9.30 10/10/2024    RBC 3.86 (L) 10/10/2024    HGB 12.0 10/10/2024    HCT 38.6 10/10/2024     (H) 10/10/2024    MCH 31.1 (H) 10/10/2024    MCHC 31.1 (L) 10/10/2024    RDW 12.8 10/10/2024     10/10/2024    MPV 10.8 10/10/2024    GRAN 5.2 10/10/2024    GRAN 55.5 10/10/2024    LYMPH 2.4 10/10/2024    LYMPH 26.2 10/10/2024    MONO 0.7 10/10/2024    MONO 7.6 10/10/2024    EOS 0.9 (H) 10/10/2024    BASO 0.03 10/10/2024    EOSINOPHIL 10.0 (H) 10/10/2024    BASOPHIL 0.3 10/10/2024      Latest Reference Range & Units 10/10/24 15:14   CO2 23 - 29 mmol/L 26     PFT results reviewed  Pulmonary Functions Testing Results:  7/14/2022   FEV1/FVC 67 86%    FEV1 1.06 L 56% 23% bronchodilator response  TLC 99%  DLC0 71%      Plan:  Clinical impression is apparently straight forward and impression with management as below.    Anjali was seen today for 3m f/u  and shortness of breath.    Diagnoses and all orders for this visit:    Wheezing  -     levalbuterol (XOPENEX HFA) 45 mcg/actuation inhaler; Inhale 1-2 puffs into the lungs every 4 (four) hours as needed for Wheezing or Shortness of Breath. Rescue    Shortness of breath  -     levalbuterol (XOPENEX HFA) 45 mcg/actuation inhaler; Inhale 1-2 puffs into the lungs every 4 (four) hours as needed for Wheezing or Shortness of Breath. Rescue    Mild intermittent asthma with exacerbation  -     levalbuterol (XOPENEX HFA) 45 mcg/actuation inhaler; Inhale 1-2 puffs into the lungs every 4 (four) hours as needed for Wheezing or Shortness of Breath. Rescue            Follow up in about 6 months (around 8/13/2025), or if symptoms worsen or fail to improve.      Discussed with patient above for education the following:      Patient Instructions   Use Breztri 1-2 puffs 1-2 times a day. You can use every day with low risk of side effects from steroids in medication    It is better to use inhaled steroids in Breztri than high doses in oral or IV medications.     Continue levalbuterol in nebulizer or rescue inhaler as needed every 4 hours.    We discuss Dupixent biologic medication for skin and asthma. Contact clinic if interested.     We discussed a CT of chest to evaluate complaint of persistent cough.     I spent a total of 34 minutes on the day of the visit.This includes face to face time and non-face to face time preparing to see the patient (eg, review of tests), obtaining and/or reviewing separately obtained history, documenting clinical information in the electronic or other health record, independently interpreting results and communicating results to the patient/family/caregiver, or care coordinator.

## 2025-03-26 DIAGNOSIS — Z12.31 OTHER SCREENING MAMMOGRAM: ICD-10-CM

## 2025-03-27 ENCOUNTER — PATIENT OUTREACH (OUTPATIENT)
Dept: ADMINISTRATIVE | Facility: HOSPITAL | Age: 75
End: 2025-03-27
Payer: COMMERCIAL

## 2025-03-27 ENCOUNTER — PATIENT MESSAGE (OUTPATIENT)
Dept: ADMINISTRATIVE | Facility: HOSPITAL | Age: 75
End: 2025-03-27
Payer: COMMERCIAL

## 2025-03-27 NOTE — PROGRESS NOTES
Population Health Chart Review & Patient Outreach Details    Outreach Performed: YES Portal Active and/or Letter inactive    Additional [facility] Notes:    BREAST CANCER SCREENING    Non-compliant report chart audits for BREAST CANCER SCREENING     Outreach to patient in reference to SCHEDULING A MAMMOGRAM EXAM.            Updates Requested / Reviewed:               Health Maintenance Topics Overdue:      VBHM Score: 2     Colon Cancer Screening  Mammogram  Uncontrolled BP    Influenza Vaccine  Pneumonia Vaccine  Shingles/Zoster Vaccine  RSV Vaccine

## 2025-04-10 ENCOUNTER — OFFICE VISIT (OUTPATIENT)
Dept: FAMILY MEDICINE | Facility: CLINIC | Age: 75
End: 2025-04-10
Payer: COMMERCIAL

## 2025-04-10 VITALS
SYSTOLIC BLOOD PRESSURE: 118 MMHG | HEART RATE: 93 BPM | WEIGHT: 132.69 LBS | HEIGHT: 60 IN | DIASTOLIC BLOOD PRESSURE: 58 MMHG | OXYGEN SATURATION: 97 % | RESPIRATION RATE: 18 BRPM | BODY MASS INDEX: 26.05 KG/M2

## 2025-04-10 DIAGNOSIS — I10 BENIGN ESSENTIAL HTN: Primary | ICD-10-CM

## 2025-04-10 DIAGNOSIS — R41.3 OTHER AMNESIA: ICD-10-CM

## 2025-04-10 DIAGNOSIS — E87.5 HYPERKALEMIA: ICD-10-CM

## 2025-04-10 DIAGNOSIS — Z13.6 ENCOUNTER FOR LIPID SCREENING FOR CARDIOVASCULAR DISEASE: ICD-10-CM

## 2025-04-10 DIAGNOSIS — Z13.220 ENCOUNTER FOR LIPID SCREENING FOR CARDIOVASCULAR DISEASE: ICD-10-CM

## 2025-04-10 PROCEDURE — G2211 COMPLEX E/M VISIT ADD ON: HCPCS | Mod: S$GLB,,, | Performed by: STUDENT IN AN ORGANIZED HEALTH CARE EDUCATION/TRAINING PROGRAM

## 2025-04-10 PROCEDURE — 1101F PT FALLS ASSESS-DOCD LE1/YR: CPT | Mod: CPTII,S$GLB,, | Performed by: STUDENT IN AN ORGANIZED HEALTH CARE EDUCATION/TRAINING PROGRAM

## 2025-04-10 PROCEDURE — 4010F ACE/ARB THERAPY RXD/TAKEN: CPT | Mod: CPTII,S$GLB,, | Performed by: STUDENT IN AN ORGANIZED HEALTH CARE EDUCATION/TRAINING PROGRAM

## 2025-04-10 PROCEDURE — 3288F FALL RISK ASSESSMENT DOCD: CPT | Mod: CPTII,S$GLB,, | Performed by: STUDENT IN AN ORGANIZED HEALTH CARE EDUCATION/TRAINING PROGRAM

## 2025-04-10 PROCEDURE — 1159F MED LIST DOCD IN RCRD: CPT | Mod: CPTII,S$GLB,, | Performed by: STUDENT IN AN ORGANIZED HEALTH CARE EDUCATION/TRAINING PROGRAM

## 2025-04-10 PROCEDURE — 3008F BODY MASS INDEX DOCD: CPT | Mod: CPTII,S$GLB,, | Performed by: STUDENT IN AN ORGANIZED HEALTH CARE EDUCATION/TRAINING PROGRAM

## 2025-04-10 PROCEDURE — 3074F SYST BP LT 130 MM HG: CPT | Mod: CPTII,S$GLB,, | Performed by: STUDENT IN AN ORGANIZED HEALTH CARE EDUCATION/TRAINING PROGRAM

## 2025-04-10 PROCEDURE — 3078F DIAST BP <80 MM HG: CPT | Mod: CPTII,S$GLB,, | Performed by: STUDENT IN AN ORGANIZED HEALTH CARE EDUCATION/TRAINING PROGRAM

## 2025-04-10 PROCEDURE — 1126F AMNT PAIN NOTED NONE PRSNT: CPT | Mod: CPTII,S$GLB,, | Performed by: STUDENT IN AN ORGANIZED HEALTH CARE EDUCATION/TRAINING PROGRAM

## 2025-04-10 PROCEDURE — 99999 PR PBB SHADOW E&M-EST. PATIENT-LVL V: CPT | Mod: PBBFAC,,, | Performed by: STUDENT IN AN ORGANIZED HEALTH CARE EDUCATION/TRAINING PROGRAM

## 2025-04-10 PROCEDURE — 99214 OFFICE O/P EST MOD 30 MIN: CPT | Mod: S$GLB,,, | Performed by: STUDENT IN AN ORGANIZED HEALTH CARE EDUCATION/TRAINING PROGRAM

## 2025-04-10 NOTE — PATIENT INSTRUCTIONS
For blood pressure- olmesartan and amlodipine. Hold on others for now    For memory- blood tests now. MRI of the brain.     See Dr. Diaz again regarding eczema/allergies/asthma. These are all related problems.       Treatment for mood issues: Walking 30 mins outside each day.

## 2025-04-14 ENCOUNTER — PATIENT OUTREACH (OUTPATIENT)
Dept: ADMINISTRATIVE | Facility: HOSPITAL | Age: 75
End: 2025-04-14
Payer: COMMERCIAL

## 2025-04-14 ENCOUNTER — PATIENT MESSAGE (OUTPATIENT)
Dept: ADMINISTRATIVE | Facility: HOSPITAL | Age: 75
End: 2025-04-14
Payer: COMMERCIAL

## 2025-04-14 NOTE — PROGRESS NOTES
Patient ID: Anjali Morfin is a 74 y.o. female.    Chief Complaint: Follow Up    History of Present Illness    CHIEF COMPLAINT:  Patient presents today for follow-up on various health concerns    MEMORY AND MOOD:  She reports memory is not functioning normally, with issues initially occurring after taking hydralazine. Her memory function has improved to approximately 50% of baseline since discontinuing hydralazine. She reports not being in a productive mood but denies feeling depressed enough to warrant medication therapy.    HYPERTENSION:  She currently takes Olmesartan (Benicar) and Amlodipine 10 mg. She discontinued Atenolol approximately two weeks ago and reports no changes in blood pressure since discontinuation.    ASTHMA:  She uses an inhaler for asthma management but cannot use Breztri due to polyethylene glycol allergy.    DERMATOLOGIC:  She presents with dishydrotic eczema affecting hands and posterior knees with current hand discomfort. She has had eczema since 3 months of age with intermittent flares throughout life.    MUSCULOSKELETAL:  She has history of bilateral knee replacements and experiences intermittent knee pain that is worse in the mornings and improves throughout the day.    FAMILY HISTORY:  Her mother had uterine cancer. No other family history of cancer.    DIET:  She reports occasional consumption of junk food, particularly donuts and candy bars, which she attributes to stress. She denies regular alcohol consumption.      ROS:  General: -fever, -chills, +fatigue, -weight gain, -weight loss  Eyes: -vision changes, -redness, -discharge  ENT: -ear pain, -nasal congestion, -sore throat  Cardiovascular: -chest pain, -palpitations, -lower extremity edema  Respiratory: -cough, -shortness of breath  Gastrointestinal: -abdominal pain, -nausea, -vomiting, -diarrhea, -constipation, -blood in stool  Genitourinary: -dysuria, -hematuria, -frequency  Musculoskeletal: -joint pain, -muscle pain, +limb  pain, +nightime pain  Skin: -rash, -lesion  Neurological: -headache, -dizziness, -numbness, -tingling, +memory loss, +memory problems  Psychiatric: -anxiety, -depression, -sleep difficulty, +new or worsening mood changes         Physical Exam    Vitals: Blood pressure: 118/58. Blood pressure: 120/58.  General: No acute distress. Well-developed. Well-nourished.  Eyes: EOMI. Sclerae anicteric.  HENT: Normocephalic. Atraumatic. Nares patent. Moist oral mucosa.  Cardiovascular: Regular rate. Regular rhythm. No murmurs. No rubs. No gallops. Normal S1, S2. Heart valves sound good. Arteries sound good.  Respiratory: Normal respiratory effort. Clear to auscultation bilaterally. No rales. No rhonchi. No wheezing.  Musculoskeletal: No  obvious deformity.  Extremities: No lower extremity edema.  Neurological: Alert & oriented x3. No slurred speech. Normal gait.  Psychiatric: Normal mood. Normal affect. Good insight. Good judgment.  Skin: Warm. Dry. No rash.         Assessment & Plan    I10 Benign essential HTN  R41.3 Other amnesia  E87.5 Hyperkalemia  Z13.220, Z13.6 Encounter for lipid screening for cardiovascular disease  J45.909 Unspecified asthma, uncomplicated  M17.0 Bilateral primary osteoarthritis of knee    IMPRESSION:  - Assessed BP control; patient off Atenolol for 2 weeks with no change in BP.  - Maintained current antihypertensive regimen with Olmesartan and amlodipine 10 mg due to well-controlled BP (120/58).  - Deferred changes to address elevated potassium levels, potentially related to Olmesartan use, to avoid multiple medication adjustments.  - Evaluated for depression; she prefers non-pharmacological interventions as initial approach.  - Considered mammogram screening, discussed age-related vs. genetic cancer risks.    BENIGN ESSENTIAL HTN:  - Discontinued Atenolol.    OTHER AMNESIA:  - Ordered MRI of the brain, vitamin level testing, and memory loss testing to evaluate memory concerns.  - Discussed potential  cognitive benefits of regular exercise and educated the patient about the cycle of inactivity and depression, emphasizing the importance of breaking this pattern.    ENCOUNTER FOR LIPID SCREENING FOR CARDIOVASCULAR DISEASE:  - Ordered cholesterol screening.    UNSPECIFIED ASTHMA, UNCOMPLICATED:  - Assessed patient's current asthma status, including physical exam and lung auscultation.  - Determined that most issues are allergy-related.  - Patient uses inhalers for management, including Breztri, which was not used due to polyethylene glycol content.  - Referred patient to an allergist for comprehensive management of asthma, eczema, and allergies.    BILATERAL PRIMARY OSTEOARTHRITIS OF KNEE:  - Patient reports intermittent knee pain, especially in the mornings.  - Discussed potential benefits of exercise for joint health and arthritis management, while acknowledging uncertainties and that knee replacements can still cause pain as the patient ages.  - Recommend a 30-minute daily walk to help lubricate the joints and potentially alleviate pain.    LIFESTYLE CHANGES:  - Implement 30-minute daily walks outside to improve mood, memory, and joint health.  - Create a schedule or calendar to track walks and maintain accountability.  - Consider starting a once-daily multivitamin if desired.  - Contact the office if interested in starting treatment for depression symptoms or scheduling a mammogram.          Diagnoses and all orders for this visit:    Benign essential HTN  -     CBC Auto Differential; Future    Other amnesia  -     Vitamin B1; Future  -     Vitamin B12; Future  -     MRI Brain Without Contrast; Future  -     Treponema Pallidium Antibodies IgG, IgM; Future    Hyperkalemia  -     Comprehensive Metabolic Panel; Future    Encounter for lipid screening for cardiovascular disease  -     Lipid Panel; Future        She declines her cancer screenings for now. We discussed this,.     No follow-ups on file.    This note  was generated with the assistance of ambient listening technology. Verbal consent was obtained by the patient and accompanying visitor(s) for the recording of patient appointment to facilitate this note. I attest to having reviewed and edited the generated note for accuracy, though some syntax or spelling errors may persist. Please contact the author of this note for any clarification.

## 2025-04-14 NOTE — PROGRESS NOTES
Population Health Chart Review & Patient Outreach Details    Outreach Performed: YES Portal Active and/or Letter inactive    Additional [facility] Notes:    BREAST CANCER SCREENING    Non-compliant report chart audits for BREAST CANCER SCREENING     Outreach to patient in reference to SCHEDULING A MAMMOGRAM EXAM.            Updates Requested / Reviewed:               Health Maintenance Topics Overdue:      VBHM Score: 2     Colon Cancer Screening  Mammogram    Influenza Vaccine  Pneumonia Vaccine  Shingles/Zoster Vaccine  RSV Vaccine                          BREAST CANCER SCREENING    Non-compliant report chart audits for BREAST CANCER SCREENING     Outreach to patient in reference to SCHEDULING A MAMMOGRAM EXAM.

## 2025-04-15 ENCOUNTER — RESULTS FOLLOW-UP (OUTPATIENT)
Dept: FAMILY MEDICINE | Facility: CLINIC | Age: 75
End: 2025-04-15

## 2025-04-15 ENCOUNTER — HOSPITAL ENCOUNTER (OUTPATIENT)
Dept: RADIOLOGY | Facility: HOSPITAL | Age: 75
Discharge: HOME | End: 2025-04-15
Attending: STUDENT IN AN ORGANIZED HEALTH CARE EDUCATION/TRAINING PROGRAM
Payer: COMMERCIAL

## 2025-04-15 DIAGNOSIS — R41.3 OTHER AMNESIA: ICD-10-CM

## 2025-04-15 PROCEDURE — 70551 MRI BRAIN STEM W/O DYE: CPT | Mod: 26,,, | Performed by: RADIOLOGY

## 2025-04-15 PROCEDURE — 70551 MRI BRAIN STEM W/O DYE: CPT | Mod: TC

## 2025-04-21 ENCOUNTER — PATIENT MESSAGE (OUTPATIENT)
Dept: ADMINISTRATIVE | Facility: HOSPITAL | Age: 75
End: 2025-04-21
Payer: COMMERCIAL

## 2025-04-22 ENCOUNTER — OFFICE VISIT (OUTPATIENT)
Dept: URGENT CARE | Facility: CLINIC | Age: 75
End: 2025-04-22
Payer: COMMERCIAL

## 2025-04-22 VITALS
WEIGHT: 132 LBS | DIASTOLIC BLOOD PRESSURE: 75 MMHG | HEIGHT: 60 IN | HEART RATE: 84 BPM | BODY MASS INDEX: 25.91 KG/M2 | TEMPERATURE: 98 F | OXYGEN SATURATION: 95 % | SYSTOLIC BLOOD PRESSURE: 144 MMHG | RESPIRATION RATE: 16 BRPM
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
## 2025-04-22 RX ORDER — AZITHROMYCIN 250 MG/1
TABLET, FILM COATED ORAL
Qty: 6 TABLET | Refills: 0 | Status: SHIPPED | OUTPATIENT
Start: 2025-04-22 | End: 2025-04-27

## 2025-04-22 RX ORDER — MUPIROCIN 20 MG/G
OINTMENT TOPICAL 3 TIMES DAILY
Qty: 22 G | Refills: 0 | Status: SHIPPED | OUTPATIENT
Start: 2025-04-22 | End: 2025-04-29

## 2025-04-22 NOTE — PROGRESS NOTES
Subjective:      Patient ID: Anjali Morfin is a 74 y.o. female.    Vitals:  height is 5' (1.524 m) and weight is 59.9 kg (132 lb). Her oral temperature is 98.1 °F (36.7 °C). Her blood pressure is 144/75 (abnormal) and her pulse is 84. Her respiration is 16 and oxygen saturation is 95%.     Chief Complaint: Animal Bite    Right hand cat claw marks.  Patient states cat clawed her this morning.  Patient washed hand with soap and water.     Animal Bite   The incident occurred today.     ROS   Objective:     Physical Exam    Assessment:     1. Scratched by cat, initial encounter        Plan:       Scratched by cat, initial encounter  -     Tdap (BOOSTRIX) vaccine injection 0.5 mL    Other orders  -     mupirocin (BACTROBAN) 2 % ointment; Apply topically 3 (three) times daily. for 7 days  Dispense: 22 g; Refill: 0  -     azithromycin (Z-SHELL) 250 MG tablet; Take 2 tablets by mouth on day 1; Take 1 tablet by mouth on days 2-5  Dispense: 6 tablet; Refill: 0      Pt presents with complaint of being scratched by cat to R hand. Pt has several minimal scratches and two approx  0.5cm superficial lacerations to R ring finger and one to 5th DIP.  Bleeding controlled. Tdap updated, wounds cleaned, placed on empiric antibiotics and return precautions discussed.

## 2025-08-14 ENCOUNTER — OFFICE VISIT (OUTPATIENT)
Dept: PULMONOLOGY | Facility: CLINIC | Age: 75
End: 2025-08-14
Payer: COMMERCIAL

## 2025-08-14 VITALS
HEIGHT: 60 IN | WEIGHT: 128.44 LBS | HEART RATE: 99 BPM | OXYGEN SATURATION: 97 % | DIASTOLIC BLOOD PRESSURE: 78 MMHG | BODY MASS INDEX: 25.22 KG/M2 | SYSTOLIC BLOOD PRESSURE: 147 MMHG

## 2025-08-14 DIAGNOSIS — J45.40 MODERATE PERSISTENT ASTHMA WITHOUT COMPLICATION: Primary | ICD-10-CM

## 2025-08-14 DIAGNOSIS — L20.9 ATOPIC DERMATITIS IN ADULT: ICD-10-CM

## 2025-08-14 PROCEDURE — 3288F FALL RISK ASSESSMENT DOCD: CPT | Mod: CPTII,S$GLB,, | Performed by: NURSE PRACTITIONER

## 2025-08-14 PROCEDURE — 1101F PT FALLS ASSESS-DOCD LE1/YR: CPT | Mod: CPTII,S$GLB,, | Performed by: NURSE PRACTITIONER

## 2025-08-14 PROCEDURE — 3008F BODY MASS INDEX DOCD: CPT | Mod: CPTII,S$GLB,, | Performed by: NURSE PRACTITIONER

## 2025-08-14 PROCEDURE — 99999 PR PBB SHADOW E&M-EST. PATIENT-LVL IV: CPT | Mod: PBBFAC,,, | Performed by: NURSE PRACTITIONER

## 2025-08-14 PROCEDURE — 3077F SYST BP >= 140 MM HG: CPT | Mod: CPTII,S$GLB,, | Performed by: NURSE PRACTITIONER

## 2025-08-14 PROCEDURE — 99213 OFFICE O/P EST LOW 20 MIN: CPT | Mod: S$GLB,,, | Performed by: NURSE PRACTITIONER

## 2025-08-14 PROCEDURE — 1159F MED LIST DOCD IN RCRD: CPT | Mod: CPTII,S$GLB,, | Performed by: NURSE PRACTITIONER

## 2025-08-14 PROCEDURE — 4010F ACE/ARB THERAPY RXD/TAKEN: CPT | Mod: CPTII,S$GLB,, | Performed by: NURSE PRACTITIONER

## 2025-08-14 PROCEDURE — 3078F DIAST BP <80 MM HG: CPT | Mod: CPTII,S$GLB,, | Performed by: NURSE PRACTITIONER

## 2025-08-20 DIAGNOSIS — L20.9 ATOPIC DERMATITIS IN ADULT: ICD-10-CM

## 2025-08-20 DIAGNOSIS — J45.50 SEVERE PERSISTENT ASTHMA WITHOUT COMPLICATION: Primary | ICD-10-CM
